# Patient Record
Sex: MALE | Race: WHITE | NOT HISPANIC OR LATINO | Employment: FULL TIME | URBAN - NONMETROPOLITAN AREA
[De-identification: names, ages, dates, MRNs, and addresses within clinical notes are randomized per-mention and may not be internally consistent; named-entity substitution may affect disease eponyms.]

---

## 2017-03-06 ENCOUNTER — OFFICE VISIT (OUTPATIENT)
Dept: INTERNAL MEDICINE | Facility: CLINIC | Age: 42
End: 2017-03-06

## 2017-03-06 VITALS
OXYGEN SATURATION: 98 % | HEIGHT: 68 IN | WEIGHT: 186.38 LBS | TEMPERATURE: 98.4 F | BODY MASS INDEX: 28.25 KG/M2 | DIASTOLIC BLOOD PRESSURE: 83 MMHG | HEART RATE: 67 BPM | SYSTOLIC BLOOD PRESSURE: 115 MMHG

## 2017-03-06 DIAGNOSIS — J01.10 SUBACUTE FRONTAL SINUSITIS: Primary | ICD-10-CM

## 2017-03-06 PROCEDURE — 99213 OFFICE O/P EST LOW 20 MIN: CPT | Performed by: INTERNAL MEDICINE

## 2017-03-06 RX ORDER — FLUTICASONE PROPIONATE 50 MCG
SPRAY, SUSPENSION (ML) NASAL
COMMUNITY
Start: 2017-02-14 | End: 2022-04-11

## 2017-03-06 RX ORDER — OMEPRAZOLE 20 MG/1
CAPSULE, DELAYED RELEASE ORAL DAILY
COMMUNITY
Start: 2016-04-07 | End: 2019-02-06 | Stop reason: SDUPTHER

## 2017-03-06 RX ORDER — CETIRIZINE HYDROCHLORIDE 10 MG/1
10 TABLET ORAL DAILY
Qty: 30 TABLET | Refills: 2 | Status: SHIPPED | OUTPATIENT
Start: 2017-03-06 | End: 2019-02-06

## 2017-03-06 NOTE — PROGRESS NOTES
Subjective  Salvatore Avtar JACK is a 42 y.o. male    HPI nasal congestion with postnasal drainage without associated fever or chills history of nausea now doing better without omeprazole. He has taken OTC decongestions without much relief is not using a steroid nose spray    The following portions of the patient's history were reviewed and updated as appropriate: allergies, current medications, past family history, past medical history, past social history, past surgical history, and problem list.     Review of Systems   HENT: Positive for congestion, sinus pressure and sneezing.    Eyes: Positive for discharge.   Respiratory: Negative for cough, chest tightness and wheezing.         Sneezing   Cardiovascular: Negative for chest pain.       Vitals:    03/06/17 1518   BP: 115/83   Pulse: 67   Temp: 98.4 °F (36.9 °C)   SpO2: 98%       Objective  Physical Exam   Constitutional: He is oriented to person, place, and time. He appears well-developed and well-nourished. No distress.   HENT:   Nose: Nose normal.   Mouth/Throat: Oropharynx is clear and moist.   Eyes: Conjunctivae and EOM are normal. No scleral icterus.   Neck: No tracheal deviation present. No thyromegaly present.   Cardiovascular: Normal rate and regular rhythm.  Exam reveals no friction rub.    No murmur heard.  Pulmonary/Chest: No respiratory distress. He has no wheezes. He has no rales.   Abdominal: Soft. He exhibits no distension and no mass. There is no tenderness. There is no guarding.   Musculoskeletal: Normal range of motion. He exhibits no deformity.   Lymphadenopathy:     He has no cervical adenopathy.   Neurological: He is alert and oriented to person, place, and time. He has normal reflexes. No cranial nerve deficit. Coordination normal.   Skin: Skin is warm and dry. No rash noted. No erythema.   Psychiatric: He has a normal mood and affect. His behavior is normal. Judgment and thought content normal.       Diagnoses and all orders for this  visit:    Subacute frontal sinusitis advised resumption of steroid nose spray. Zyrtec at nighttime. Allergen avoidance    Other orders  -     omeprazole (priLOSEC) 20 MG capsule; Take  by mouth Daily.  -     fluticasone (FLONASE) 50 MCG/ACT nasal spray;

## 2017-03-13 ENCOUNTER — HOSPITAL ENCOUNTER (EMERGENCY)
Facility: HOSPITAL | Age: 42
Discharge: HOME | End: 2017-03-13
Attending: EMERGENCY MEDICINE | Admitting: EMERGENCY MEDICINE

## 2017-03-13 ENCOUNTER — APPOINTMENT (OUTPATIENT)
Dept: CT IMAGING | Facility: HOSPITAL | Age: 42
End: 2017-03-13

## 2017-03-13 VITALS
BODY MASS INDEX: 28.04 KG/M2 | SYSTOLIC BLOOD PRESSURE: 117 MMHG | OXYGEN SATURATION: 97 % | HEART RATE: 56 BPM | TEMPERATURE: 98 F | DIASTOLIC BLOOD PRESSURE: 76 MMHG | HEIGHT: 68 IN | WEIGHT: 185 LBS | RESPIRATION RATE: 18 BRPM

## 2017-03-13 DIAGNOSIS — K42.9 UMBILICAL HERNIA WITHOUT OBSTRUCTION AND WITHOUT GANGRENE: Primary | ICD-10-CM

## 2017-03-13 LAB
ALBUMIN SERPL-MCNC: 4.6 G/DL (ref 3.5–5)
ALBUMIN/GLOB SERPL: 1.4 G/DL (ref 1–2)
ALP SERPL-CCNC: 82 U/L (ref 38–126)
ALT SERPL W P-5'-P-CCNC: 30 U/L (ref 13–69)
ANION GAP SERPL CALCULATED.3IONS-SCNC: 11.7 MMOL/L
AST SERPL-CCNC: 29 U/L (ref 15–46)
BASOPHILS # BLD AUTO: 0.05 10*3/MM3 (ref 0–0.2)
BASOPHILS NFR BLD AUTO: 0.5 % (ref 0–2.5)
BILIRUB SERPL-MCNC: 0.5 MG/DL (ref 0.2–1.3)
BILIRUB UR QL STRIP: NEGATIVE
BUN BLD-MCNC: 10 MG/DL (ref 7–20)
BUN/CREAT SERPL: 11.1 (ref 6.3–21.9)
CALCIUM SPEC-SCNC: 9.4 MG/DL (ref 8.4–10.2)
CHLORIDE SERPL-SCNC: 104 MMOL/L (ref 98–107)
CLARITY UR: CLEAR
CO2 SERPL-SCNC: 32 MMOL/L (ref 26–30)
COLOR UR: NORMAL
CREAT BLD-MCNC: 0.9 MG/DL (ref 0.6–1.3)
DEPRECATED RDW RBC AUTO: 40.9 FL (ref 37–54)
EOSINOPHIL # BLD AUTO: 0.22 10*3/MM3 (ref 0–0.7)
EOSINOPHIL NFR BLD AUTO: 2.3 % (ref 0–7)
ERYTHROCYTE [DISTWIDTH] IN BLOOD BY AUTOMATED COUNT: 12.6 % (ref 11.5–14.5)
GFR SERPL CREATININE-BSD FRML MDRD: 93 ML/MIN/1.73
GLOBULIN UR ELPH-MCNC: 3.4 GM/DL
GLUCOSE BLD-MCNC: 91 MG/DL (ref 74–98)
GLUCOSE UR STRIP-MCNC: NEGATIVE MG/DL
HCT VFR BLD AUTO: 43 % (ref 42–52)
HGB BLD-MCNC: 14.5 G/DL (ref 14–18)
HGB UR QL STRIP.AUTO: NEGATIVE
IMM GRANULOCYTES # BLD: 0.04 10*3/MM3 (ref 0–0.06)
IMM GRANULOCYTES NFR BLD: 0.4 % (ref 0–0.6)
KETONES UR QL STRIP: NEGATIVE
LEUKOCYTE ESTERASE UR QL STRIP.AUTO: NEGATIVE
LYMPHOCYTES # BLD AUTO: 1.95 10*3/MM3 (ref 0.6–3.4)
LYMPHOCYTES NFR BLD AUTO: 20 % (ref 10–50)
MCH RBC QN AUTO: 30 PG (ref 27–31)
MCHC RBC AUTO-ENTMCNC: 33.7 G/DL (ref 30–37)
MCV RBC AUTO: 88.8 FL (ref 80–94)
MONOCYTES # BLD AUTO: 0.82 10*3/MM3 (ref 0–0.9)
MONOCYTES NFR BLD AUTO: 8.4 % (ref 0–12)
NEUTROPHILS # BLD AUTO: 6.69 10*3/MM3 (ref 2–6.9)
NEUTROPHILS NFR BLD AUTO: 68.4 % (ref 37–80)
NITRITE UR QL STRIP: NEGATIVE
NRBC BLD MANUAL-RTO: 0 /100 WBC (ref 0–0)
PH UR STRIP.AUTO: 6 [PH] (ref 5–8)
PLATELET # BLD AUTO: 220 10*3/MM3 (ref 130–400)
PMV BLD AUTO: 12.1 FL (ref 6–12)
POTASSIUM BLD-SCNC: 3.7 MMOL/L (ref 3.5–5.1)
PROT SERPL-MCNC: 8 G/DL (ref 6.3–8.2)
PROT UR QL STRIP: NEGATIVE
RBC # BLD AUTO: 4.84 10*6/MM3 (ref 4.7–6.1)
SODIUM BLD-SCNC: 144 MMOL/L (ref 137–145)
SP GR UR STRIP: <=1.005 (ref 1–1.03)
UROBILINOGEN UR QL STRIP: NORMAL
WBC NRBC COR # BLD: 9.77 10*3/MM3 (ref 4.8–10.8)

## 2017-03-13 PROCEDURE — 80053 COMPREHEN METABOLIC PANEL: CPT | Performed by: PHYSICIAN ASSISTANT

## 2017-03-13 PROCEDURE — 96360 HYDRATION IV INFUSION INIT: CPT

## 2017-03-13 PROCEDURE — 81003 URINALYSIS AUTO W/O SCOPE: CPT | Performed by: PHYSICIAN ASSISTANT

## 2017-03-13 PROCEDURE — 74177 CT ABD & PELVIS W/CONTRAST: CPT

## 2017-03-13 PROCEDURE — 85025 COMPLETE CBC W/AUTO DIFF WBC: CPT | Performed by: PHYSICIAN ASSISTANT

## 2017-03-13 PROCEDURE — 99283 EMERGENCY DEPT VISIT LOW MDM: CPT

## 2017-03-13 PROCEDURE — 0 IOPAMIDOL 61 % SOLUTION: Performed by: EMERGENCY MEDICINE

## 2017-03-13 RX ORDER — SODIUM CHLORIDE 0.9 % (FLUSH) 0.9 %
10 SYRINGE (ML) INJECTION AS NEEDED
Status: DISCONTINUED | OUTPATIENT
Start: 2017-03-13 | End: 2017-03-13 | Stop reason: HOSPADM

## 2017-03-13 RX ADMIN — IOPAMIDOL 100 ML: 612 INJECTION, SOLUTION INTRAVENOUS at 20:45

## 2017-03-13 RX ADMIN — SODIUM CHLORIDE 1000 ML: 9 INJECTION, SOLUTION INTRAVENOUS at 20:08

## 2017-03-14 ENCOUNTER — OFFICE VISIT (OUTPATIENT)
Dept: SURGERY | Facility: CLINIC | Age: 42
End: 2017-03-14

## 2017-03-14 VITALS
HEART RATE: 70 BPM | HEIGHT: 68 IN | TEMPERATURE: 98.2 F | DIASTOLIC BLOOD PRESSURE: 86 MMHG | BODY MASS INDEX: 28.04 KG/M2 | WEIGHT: 185 LBS | SYSTOLIC BLOOD PRESSURE: 134 MMHG

## 2017-03-14 DIAGNOSIS — K42.0 UMBILICAL HERNIA WITH OBSTRUCTION, WITHOUT GANGRENE: Primary | ICD-10-CM

## 2017-03-14 LAB
HOLD SPECIMEN: NORMAL
HOLD SPECIMEN: NORMAL
WHOLE BLOOD HOLD SPECIMEN: NORMAL

## 2017-03-14 PROCEDURE — 99243 OFF/OP CNSLTJ NEW/EST LOW 30: CPT | Performed by: SURGERY

## 2017-03-14 NOTE — ED PROVIDER NOTES
Subjective   HPI Comments: 42-year-old male presents with pain around his mid abdomen and belly button area.  Started this morning.  He describes it as a sharp pain.  It is tender to touch.  Some nausea no vomiting.  He states that he has a history of a small hernia.  No surgeries on his abdomen.  No chest pain no shortness of breath.  No urinary symptoms.      History provided by:  Patient   used: No        Review of Systems   Gastrointestinal: Positive for abdominal pain and nausea.   All other systems reviewed and are negative.      Past Medical History   Diagnosis Date   • GERD (gastroesophageal reflux disease)    • Seasonal allergies        No Known Allergies    History reviewed. No pertinent past surgical history.    History reviewed. No pertinent family history.    Social History     Social History   • Marital status:      Spouse name: N/A   • Number of children: N/A   • Years of education: N/A     Social History Main Topics   • Smoking status: Never Smoker   • Smokeless tobacco: None   • Alcohol use None   • Drug use: No   • Sexual activity: Not Asked     Other Topics Concern   • None     Social History Narrative   • None           Objective   Physical Exam   Constitutional: He is oriented to person, place, and time. He appears well-developed and well-nourished.   HENT:   Head: Normocephalic and atraumatic.   Left Ear: External ear normal.   Eyes: EOM are normal. Pupils are equal, round, and reactive to light.   Neck: Normal range of motion.   Cardiovascular: Normal rate and regular rhythm.    Pulmonary/Chest: Effort normal and breath sounds normal.   Abdominal: Soft. Bowel sounds are normal. He exhibits no mass. There is tenderness. There is no rebound and no guarding.   Umbilical area tenderness to palpation   Musculoskeletal: Normal range of motion.   Neurological: He is alert and oriented to person, place, and time.   Skin: Skin is warm and dry.   Psychiatric: He has a normal  mood and affect. His behavior is normal. Judgment and thought content normal.   Nursing note and vitals reviewed.      Procedures         ED Course  ED Course   Comment By Time   Discussed with Dr. Ho he stated that the fat containing umbilical hernia can be seen as an outpatient he will follow-up with him in the clinic in 2 days Woo Bynum Jr., PA-C 03/13 2059                  [facility]    Final diagnoses:   Umbilical hernia without obstruction and without gangrene            Woo Bynum Jr., PA-C  03/13/17 2109

## 2017-03-14 NOTE — PROGRESS NOTES
Reason for Consultation: Umbilical Hernia      Chief complaint   Chief Complaint   Patient presents with   • Hernia     abn. ct scan       Subjective .     History of present illness:  I saw the patient in the office today as a consultation for an abnormal CT scan showing a subumbilical hernia w/ omental infarct anterior peritoneum.  Patient states he had a small bulge @ umbilical area for a few years with no pain.  Mr. Nova states he woke up on Monday with severe constant pain @ the umbilical area. Patient states he cannot get any relief.    Review of Systems   Constitutional: Negative for chills, fever and unexpected weight change.   HENT: Negative for trouble swallowing and voice change.    Eyes: Negative for visual disturbance.   Respiratory: Negative for apnea, cough, chest tightness, shortness of breath and wheezing.    Cardiovascular: Negative for chest pain, palpitations and leg swelling.   Gastrointestinal: Positive for abdominal pain (umbilical pain). Negative for abdominal distention, anal bleeding, blood in stool, constipation, diarrhea, nausea, rectal pain and vomiting.   Endocrine: Negative for cold intolerance and heat intolerance.   Genitourinary: Negative for difficulty urinating, dysuria, flank pain, scrotal swelling and testicular pain.   Musculoskeletal: Negative for back pain, gait problem and joint swelling.   Skin: Negative for color change, rash and wound.   Neurological: Negative for dizziness, syncope, speech difficulty, weakness, numbness and headaches.   Hematological: Negative for adenopathy. Does not bruise/bleed easily.   Psychiatric/Behavioral: Negative for confusion. The patient is not nervous/anxious.        History  Past Medical History   Diagnosis Date   • GERD (gastroesophageal reflux disease)    • Seasonal allergies        Past Surgical History   Procedure Laterality Date   • Tonsillectomy     • Vasectomy reversal     • Varicocele excision     • Sinus surgery          Family History   Problem Relation Age of Onset   • No Known Problems Mother    • No Known Problems Father    • No Known Problems Sister    • No Known Problems Brother        Social History   Substance Use Topics   • Smoking status: Never Smoker   • Smokeless tobacco: Never Used   • Alcohol use No       Review of patient's allergies indicates no known allergies.    Objective     Vital Signs   Temp:  [97.7 °F (36.5 °C)-98.2 °F (36.8 °C)] 98.2 °F (36.8 °C)  Heart Rate:  [56-70] 70  Resp:  [18] 18  BP: (117-134)/(76-88) 134/86    Physical Exam:     General Appearance:    Alert, cooperative, in no acute distress   Head:    Normocephalic, without obvious abnormality, atraumatic   Eyes:            Lids and lashes normal, conjunctivae and sclerae normal, no   icterus, no pallor, corneas clear, PERRLA   Ears:    Ears appear intact with no abnormalities noted   Back:     No kyphosis present, no scoliosis present, no skin lesions,      erythema or scars, no tenderness to percussion or                   palpation,   range of motion normal   Lungs:     Clear to auscultation,respirations regular, even and                  unlabored    Heart:    Regular rhythm and normal rate, normal S1 and S2, no            murmur   Abdomen:     no masses, no organomegaly, soft non-tender, non-distended, no guarding, there is evidence of a incarcerated umbilical hernia with omental contents.  Quite tender.  Not reducible.     Extremities:   Moves all extremities well, no edema, no cyanosis, no             redness   Pulses:   Pulses palpable and equal bilaterally   Skin:   No bleeding, bruising or rash   Neurologic:   Cranial nerves 2 - 12 grossly intact, sensation intact   He had trach: No evidence of depression or anxiety        Results Review:   I reviewed the patient's new clinical results. I personally reviewed the CT scan results.      Assessment/Plan : Incarcerated umbilical hernia: Symptomatic    1. Umbilical hernia with obstruction,  without gangrene        I had a detailed and extensive discussion with the patient in the office and they understand that they need to undergo umbilical hernia repair with possible mesh.  Full risks and benefits of operative versus nonoperative intervention were discussed with the patient and these included things such as nonresolution of symptoms and possible worsening of symptoms without surgical intervention versus infection, bleeding, possible recurrent hernia, possible postoperative neuralgia from nerve damage or involvement with scar tissue, etc.  The patient understands, agrees, and had no questions for me at the end of the office visit.     I discussed the patients findings and my recommendations with patient.        Humble Sanches MD  03/14/17

## 2017-03-15 ENCOUNTER — OUTSIDE FACILITY SERVICE (OUTPATIENT)
Dept: SURGERY | Facility: CLINIC | Age: 42
End: 2017-03-15

## 2017-03-15 PROCEDURE — 49587 PR REPAIR UMBILICAL HERN,5+Y/O,STRANG: CPT | Performed by: SURGERY

## 2017-03-23 ENCOUNTER — DOCUMENTATION (OUTPATIENT)
Dept: SURGERY | Facility: CLINIC | Age: 42
End: 2017-03-23

## 2017-03-23 ENCOUNTER — OFFICE VISIT (OUTPATIENT)
Dept: SURGERY | Facility: CLINIC | Age: 42
End: 2017-03-23

## 2017-03-23 VITALS
DIASTOLIC BLOOD PRESSURE: 86 MMHG | SYSTOLIC BLOOD PRESSURE: 120 MMHG | BODY MASS INDEX: 28.04 KG/M2 | TEMPERATURE: 98.8 F | WEIGHT: 185 LBS | HEIGHT: 68 IN

## 2017-03-23 DIAGNOSIS — Z48.89 POSTOPERATIVE VISIT: Primary | ICD-10-CM

## 2017-03-23 PROCEDURE — 99024 POSTOP FOLLOW-UP VISIT: CPT | Performed by: SURGERY

## 2017-03-23 RX ORDER — HYDROCODONE BITARTRATE AND ACETAMINOPHEN 5; 325 MG/1; MG/1
TABLET ORAL
COMMUNITY
Start: 2017-03-15 | End: 2017-11-13

## 2017-03-23 NOTE — PROGRESS NOTES
History of present illness:  I saw the patient in the office today as a followup from their recent hernia repair.  They state that they have done well and are having no complaints.      Vital Signs   Temp:  [98.8 °F (37.1 °C)] 98.8 °F (37.1 °C)  BP: (120)/(86) 120/86    Physical Exam:     General Appearance:    Alert, cooperative, in no acute distress   Abdomen:     no masses, no organomegaly, soft non-tender, non-distended, no guarding, wounds are well healed, no evidence of recurrent hernia       Assessment / Plan : Postoperative after an umbilical hernia repair that was incarcerated.  He is feeling much improved.  I gave him detailed activity instructions.    I did discuss the situation with the patient today in the office and they have done well from their recent herniorraphy.  I have released the patient back to normal activity, they understand that they need to be careful about heavy lifting.  I need to see the patient back in the office only if they are having further problems, they know to call me if they are.    Humble Sanches MD  03/23/17

## 2017-11-13 ENCOUNTER — OFFICE VISIT (OUTPATIENT)
Dept: INTERNAL MEDICINE | Facility: CLINIC | Age: 42
End: 2017-11-13

## 2017-11-13 VITALS
WEIGHT: 188 LBS | DIASTOLIC BLOOD PRESSURE: 76 MMHG | SYSTOLIC BLOOD PRESSURE: 128 MMHG | BODY MASS INDEX: 28.49 KG/M2 | HEIGHT: 68 IN | HEART RATE: 67 BPM | TEMPERATURE: 98.2 F | OXYGEN SATURATION: 99 %

## 2017-11-13 DIAGNOSIS — M25.562 CHRONIC PAIN OF LEFT KNEE: ICD-10-CM

## 2017-11-13 DIAGNOSIS — S29.019A THORACIC MYOFASCIAL STRAIN, INITIAL ENCOUNTER: Primary | ICD-10-CM

## 2017-11-13 DIAGNOSIS — G89.29 CHRONIC PAIN OF LEFT KNEE: ICD-10-CM

## 2017-11-13 PROBLEM — I45.6 WOLFF-PARKINSON-WHITE (WPW) PATTERN: Status: ACTIVE | Noted: 2017-11-13

## 2017-11-13 PROCEDURE — 99213 OFFICE O/P EST LOW 20 MIN: CPT | Performed by: PHYSICIAN ASSISTANT

## 2017-11-13 RX ORDER — CYCLOBENZAPRINE HCL 5 MG
5 TABLET ORAL NIGHTLY PRN
Qty: 15 TABLET | Refills: 0 | Status: SHIPPED | OUTPATIENT
Start: 2017-11-13 | End: 2018-03-13 | Stop reason: ALTCHOICE

## 2017-11-13 NOTE — PROGRESS NOTES
Salvatore Nova III is a 42 y.o. male.     Subjective   History of Present Illness   Here today with concern of back pain which began 2 days ago when he reached up above his head to grab something and before he even grabbed it he felt and heard a loud pop which caused immediate sharp pain in the mid-back.  Since then he has had baseline discomfort with sharp pain when laying on his back, bending forward and with coughing.  Denies SOA except the pain sometimes takes his breast away.      Also having chronic but worsening left knee pain anteriorly and posteriorly. Has seen Dr. Shepard before and would like to discuss with him again.         The following portions of the patient's history were reviewed and updated as appropriate: allergies, current medications, past family history, past medical history, past social history, past surgical history and problem list.    Review of Systems    Constitutional: Negative for appetite change, chills, fatigue, fever and unexpected weight change.   HENT: Negative for congestion, ear pain, hearing loss, nosebleeds, postnasal drip, rhinorrhea, sore throat, tinnitus and trouble swallowing.    Eyes: Negative for photophobia, discharge and visual disturbance.   Respiratory: Negative for cough, chest tightness, shortness of breath and wheezing.    Cardiovascular: Negative for chest pain, palpitations and leg swelling.   Gastrointestinal: Negative for abdominal distention, abdominal pain, blood in stool, constipation, diarrhea, nausea and vomiting.   Endocrine: Negative for cold intolerance, heat intolerance, polydipsia, polyphagia and polyuria.   Musculoskeletal: left knee pain, middle back pain. Negative for joint swelling, myalgias, neck pain and neck stiffness.   Skin: Negative for color change, pallor, rash and wound.   Allergic/Immunologic: Negative for environmental allergies, food allergies and immunocompromised state.   Neurological: Negative for dizziness, tremors, seizures, weakness,  "numbness and headaches.   Hematological: Negative for adenopathy. Does not bruise/bleed easily.   Psychiatric/Behavioral: Negative for sleep disturbances, agitation, behavioral problems, confusion, hallucinations, self-injury and suicidal ideas. The patient is not nervous/anxious.      Objective    Physical Exam  Constitutional: Oriented to person, place, and time. Appears well-developed and well-nourished.   HENT:   Head: Normocephalic and atraumatic.   Eyes: EOM are normal. Pupils are equal, round, and reactive to light.   Neck: Normal range of motion. Neck supple.   Cardiovascular: Normal rate, regular rhythm and normal heart sounds.    Pulmonary/Chest: Effort normal and breath sounds normal. No respiratory distress.  Has no wheezes or rales. Exhibits no chest wall tenderness.   Abdominal: Soft. Bowel sounds are normal. Exhibits no distension and no mass. There is no tenderness.   Musculoskeletal: thoracic paravertebral tenderness bilaterally. Normal range of motion.   Neurological: Alert and oriented to person, place, and time.   Skin: Skin is warm and dry.   Psychiatric: Has a normal mood and affect. Behavior is normal. Judgment and thought content normal.       /76  Pulse 67  Temp 98.2 °F (36.8 °C)  Ht 68\" (172.7 cm)  Wt 188 lb (85.3 kg)  SpO2 99%  BMI 28.59 kg/m2    Nursing note and vitals reviewed.        Assessment/Plan   Salvatore was seen today for back pain.    Diagnoses and all orders for this visit:    Thoracic myofascial strain, initial encounter  -     cyclobenzaprine (FLEXERIL) 5 MG tablet; Take 1 tablet by mouth At Night As Needed for Muscle Spasms.  -     diclofenac (VOLTAREN) 1 % gel gel; Apply 4 g topically 4 (Four) Times a Day As Needed (joint pain).    Chronic pain of left knee  -     Ambulatory Referral to Orthopedic Surgery  -     diclofenac (VOLTAREN) 1 % gel gel; Apply 4 g topically 4 (Four) Times a Day As Needed (joint pain).      Call or RTC if symptoms worsen or persist. "

## 2018-03-08 ENCOUNTER — OFFICE VISIT (OUTPATIENT)
Dept: INTERNAL MEDICINE | Facility: CLINIC | Age: 43
End: 2018-03-08

## 2018-03-08 VITALS
TEMPERATURE: 98.6 F | HEIGHT: 68 IN | DIASTOLIC BLOOD PRESSURE: 78 MMHG | BODY MASS INDEX: 28.04 KG/M2 | OXYGEN SATURATION: 99 % | WEIGHT: 185 LBS | SYSTOLIC BLOOD PRESSURE: 108 MMHG | HEART RATE: 72 BPM

## 2018-03-08 DIAGNOSIS — J01.00 ACUTE NON-RECURRENT MAXILLARY SINUSITIS: Primary | ICD-10-CM

## 2018-03-08 LAB
EXPIRATION DATE: NORMAL
EXPIRATION DATE: NORMAL
FLUAV AG NPH QL: NEGATIVE
FLUBV AG NPH QL: NEGATIVE
INTERNAL CONTROL: NORMAL
INTERNAL CONTROL: NORMAL
Lab: NORMAL
Lab: NORMAL
S PYO AG THROAT QL: NEGATIVE

## 2018-03-08 PROCEDURE — 87804 INFLUENZA ASSAY W/OPTIC: CPT | Performed by: PHYSICIAN ASSISTANT

## 2018-03-08 PROCEDURE — 99213 OFFICE O/P EST LOW 20 MIN: CPT | Performed by: PHYSICIAN ASSISTANT

## 2018-03-08 PROCEDURE — 87880 STREP A ASSAY W/OPTIC: CPT | Performed by: PHYSICIAN ASSISTANT

## 2018-03-08 RX ORDER — BROMPHENIRAMINE MALEATE, PSEUDOEPHEDRINE HYDROCHLORIDE, AND DEXTROMETHORPHAN HYDROBROMIDE 2; 30; 10 MG/5ML; MG/5ML; MG/5ML
10 SYRUP ORAL 4 TIMES DAILY PRN
Qty: 200 ML | Refills: 0 | Status: SHIPPED | OUTPATIENT
Start: 2018-03-08 | End: 2018-03-13

## 2018-03-08 RX ORDER — AMOXICILLIN AND CLAVULANATE POTASSIUM 875; 125 MG/1; MG/1
1 TABLET, FILM COATED ORAL 2 TIMES DAILY
Qty: 20 TABLET | Refills: 0 | Status: SHIPPED | OUTPATIENT
Start: 2018-03-08 | End: 2018-03-13

## 2018-03-08 NOTE — PROGRESS NOTES
Salvatore Nova III is a 43 y.o. male.     Subjective   History of Present Illness   Here today with concern of cough, headache, sore throat, body aches, nasal congestion and sneezing 1 week ago which suddenly all worsened yesterday. He denies fever, chills or SOA.         The following portions of the patient's history were reviewed and updated as appropriate: allergies, current medications, past family history, past medical history, past social history, past surgical history and problem list.    Review of Systems    Constitutional: Negative for appetite change, chills, fatigue, fever and unexpected weight change.   HENT: sore throat, congestion, sneezing.  Negative for ear pain, hearing loss, nosebleeds, postnasal drip, rhinorrhea, tinnitus and trouble swallowing.    Eyes: Negative for photophobia, discharge and visual disturbance.   Respiratory: cough. Negative for chest tightness, shortness of breath and wheezing.    Cardiovascular: Negative for chest pain, palpitations and leg swelling.   Gastrointestinal: Negative for abdominal distention, abdominal pain, blood in stool, constipation, diarrhea, nausea and vomiting.   Endocrine: Negative for cold intolerance, heat intolerance, polydipsia, polyphagia and polyuria.   Musculoskeletal: body aches. Negative for back pain, joint swelling, myalgias, neck pain and neck stiffness.   Skin: Negative for color change, pallor, rash and wound.   Allergic/Immunologic: Negative for environmental allergies, food allergies and immunocompromised state.   Neurological: headache. Negative for dizziness, tremors, seizures, weakness, numbness.   Hematological: Negative for adenopathy. Does not bruise/bleed easily.   Psychiatric/Behavioral: Negative for sleep disturbances, agitation, behavioral problems, confusion, hallucinations, self-injury and suicidal ideas. The patient is not nervous/anxious.      Objective    Physical Exam  Constitutional: Oriented to person, place, and time.  "Appears well-developed and well-nourished.   HENT: OP erythema without exudate or petechiae.   Head: maxillary sinus tenderness bilaterally. Normocephalic and atraumatic.   Eyes: EOM are normal. Pupils are equal, round, and reactive to light.   Neck: Normal range of motion. Neck supple.   Cardiovascular: Normal rate, regular rhythm and normal heart sounds.    Pulmonary/Chest: Effort normal and breath sounds normal. No respiratory distress.  Has no wheezes or rales. Exhibits no chest wall tenderness.   Abdominal: Soft. Bowel sounds are normal. Exhibits no distension and no mass. There is no tenderness.   Musculoskeletal: Normal range of motion. Exhibits no tenderness.   Neurological: Alert and oriented to person, place, and time.   Skin: Skin is warm and dry.   Psychiatric: Has a normal mood and affect. Behavior is normal. Judgment and thought content normal.       /78  Pulse 72  Temp 98.6 °F (37 °C)  Ht 172.7 cm (67.99\")  Wt 83.9 kg (185 lb)  SpO2 99%  BMI 28.14 kg/m2    Nursing note and vitals reviewed.        Assessment/Plan   Salvatore was seen today for cough.    Diagnoses and all orders for this visit:    Acute non-recurrent maxillary sinusitis  -     POC Rapid Strep A- negative  -     POC Influenza A / B- negative  -     amoxicillin-clavulanate (AUGMENTIN) 875-125 MG per tablet; Take 1 tablet by mouth 2 (Two) Times a Day for 10 days.    Other orders  -     brompheniramine-pseudoephedrine-DM 30-2-10 MG/5ML syrup; Take 10 mL by mouth 4 (Four) Times a Day As Needed for Congestion or Cough.      Call or RTC if symptoms worsen or persist.            "

## 2018-03-13 ENCOUNTER — OFFICE VISIT (OUTPATIENT)
Dept: INTERNAL MEDICINE | Facility: CLINIC | Age: 43
End: 2018-03-13

## 2018-03-13 ENCOUNTER — HOSPITAL ENCOUNTER (OUTPATIENT)
Dept: GENERAL RADIOLOGY | Facility: HOSPITAL | Age: 43
Discharge: HOME | End: 2018-03-13
Admitting: PHYSICIAN ASSISTANT

## 2018-03-13 VITALS
WEIGHT: 188 LBS | TEMPERATURE: 98.1 F | BODY MASS INDEX: 28.49 KG/M2 | HEIGHT: 68 IN | DIASTOLIC BLOOD PRESSURE: 78 MMHG | SYSTOLIC BLOOD PRESSURE: 130 MMHG | OXYGEN SATURATION: 96 % | HEART RATE: 58 BPM

## 2018-03-13 DIAGNOSIS — M54.2 NECK PAIN: Primary | ICD-10-CM

## 2018-03-13 PROCEDURE — 99213 OFFICE O/P EST LOW 20 MIN: CPT | Performed by: PHYSICIAN ASSISTANT

## 2018-03-13 PROCEDURE — 72050 X-RAY EXAM NECK SPINE 4/5VWS: CPT

## 2018-03-13 RX ORDER — METHOCARBAMOL 500 MG/1
500 TABLET, FILM COATED ORAL 3 TIMES DAILY PRN
Qty: 60 TABLET | Refills: 1 | Status: SHIPPED | OUTPATIENT
Start: 2018-03-13 | End: 2018-04-23

## 2018-03-13 NOTE — PROGRESS NOTES
"Subjective     Chief Complaint: Neck pain    History of Present Illness     Salvatore Nova III is a 43 y.o. male presenting with complaintx of neck pain and stiffness radiating into his shoulders ×2 weeks.  Patient was recently ill and treated for a sinus infection, completed a course of antibiotics, is now feeling much better.  Wasn't sure if neck pain was due to his sinus symptoms.  Patient denies recent or prior injury.  States his neck feels really tight.  Has been using a heating pad and trying to massage the area.  Taking Tylenol 2-3 times daily but has not been helping.  Has not tried diclofenac gel he had been prescribed for knee. Denies numbness, tingling, burning.  Denies weakness in arm or shoulder.    The following portions of the patient's history were reviewed and updated as appropriate: current medications, allergies, PMH.    Review of Systems   Musculoskeletal: Positive for neck pain and neck stiffness.       Objective     Vitals:    03/13/18 1307   BP: 130/78   Pulse: 58   Temp: 98.1 °F (36.7 °C)   SpO2: 96%   Weight: 85.3 kg (188 lb)   Height: 172.7 cm (67.99\")     Physical Exam   Constitutional: He appears well-developed and well-nourished.   Musculoskeletal:        Right shoulder: Normal.        Left shoulder: Normal.        Cervical back: He exhibits pain and spasm. He exhibits normal range of motion and no deformity.   Pain with flexion and extension.  Some pain with rotation.     Assessment/Plan     Diagnoses and all orders for this visit:    Neck pain  -     XR Spine Cervical Complete 4 or 5 View  -     methocarbamol (ROBAXIN) 500 MG tablet; Take 1 tablet by mouth 3 (Three) Times a Day As Needed for Muscle Spasms.      Discussed applying heat 20 minutes a day 3 times daily.    Gentle stretching.  Will try muscle relaxer prn.    Agnieszka Mg PA-C  03/13/2018         Please note that portions of this note were completed with a voice recognition program. Efforts were made to edit dictation, but " occasionally words are mistranscribed.

## 2018-03-22 ENCOUNTER — OFFICE VISIT (OUTPATIENT)
Dept: INTERNAL MEDICINE | Facility: CLINIC | Age: 43
End: 2018-03-22

## 2018-03-22 ENCOUNTER — TELEPHONE (OUTPATIENT)
Dept: INTERNAL MEDICINE | Facility: CLINIC | Age: 43
End: 2018-03-22

## 2018-03-22 VITALS
SYSTOLIC BLOOD PRESSURE: 128 MMHG | HEIGHT: 68 IN | TEMPERATURE: 97.4 F | WEIGHT: 194 LBS | BODY MASS INDEX: 29.4 KG/M2 | OXYGEN SATURATION: 95 % | HEART RATE: 51 BPM | DIASTOLIC BLOOD PRESSURE: 74 MMHG

## 2018-03-22 DIAGNOSIS — M62.838 MUSCLE SPASM: Primary | ICD-10-CM

## 2018-03-22 PROCEDURE — 99214 OFFICE O/P EST MOD 30 MIN: CPT | Performed by: INTERNAL MEDICINE

## 2018-03-22 RX ORDER — MELOXICAM 7.5 MG/1
7.5 TABLET ORAL DAILY
Qty: 60 TABLET | Refills: 0 | Status: SHIPPED | OUTPATIENT
Start: 2018-03-22 | End: 2019-02-06

## 2018-03-22 RX ORDER — METHYLPREDNISOLONE 4 MG/1
TABLET ORAL
Qty: 1 EACH | Refills: 0 | Status: SHIPPED | OUTPATIENT
Start: 2018-03-22 | End: 2018-04-23

## 2018-03-22 NOTE — TELEPHONE ENCOUNTER
KATERIN CALLED AND NEEDS CLARIFICATION ON MELOXICAM, HAS 2 SET OF INSTRUCTIONS. PER DR VERMEESCH'S NOTE, ADVISES TO TAKE 1-2 TIMES A DAY WITH FOOD. PHARMACIST (KRISTI) NOTIFIED.

## 2018-03-22 NOTE — PROGRESS NOTES
"Chief Complaint   Patient presents with   • Neck Pain     and shoulder pain, right side.      Subjective   Salvatore Nova III is a 43 y.o. male.     Neck Pain    This is a recurrent problem. The current episode started 1 to 4 weeks ago. The problem occurs constantly. The problem has been waxing and waning. The pain is associated with nothing. The pain is present in the left side. The quality of the pain is described as aching. The pain is at a severity of 5/10. The pain is moderate. Nothing aggravates the symptoms. Stiffness is present all day and in the morning. Pertinent negatives include no headaches, numbness, paresis, tingling or weakness. He has tried muscle relaxants, heat, home exercises and acetaminophen for the symptoms. The treatment provided mild relief.        The following portions of the patient's history were reviewed and updated as appropriate: allergies, current medications, past family history, past medical history, past social history, past surgical history and problem list.    Review of Systems   Musculoskeletal: Positive for neck pain and neck stiffness.   Neurological: Negative for tingling, weakness, numbness and headaches.       Objective   /74   Pulse 51   Temp 97.4 °F (36.3 °C)   Ht 172.7 cm (68\")   Wt 88 kg (194 lb)   SpO2 95%   BMI 29.50 kg/m²   Body mass index is 29.5 kg/m².  Physical Exam   Constitutional: He is oriented to person, place, and time. He appears well-developed and well-nourished.   HENT:   Head: Normocephalic and atraumatic.   Eyes: EOM are normal. Pupils are equal, round, and reactive to light.   Neck:   Limited range of motion in all planes, pain over left trapezius muscle   Pulmonary/Chest: Effort normal.   Musculoskeletal:   Left shoulder with full range of motion, pain over left deltoid muscle, left arm muscle strength 5/5   Neurological: He is alert and oriented to person, place, and time. He has normal reflexes. No cranial nerve deficit. Coordination " normal.   Psychiatric: He has a normal mood and affect. His behavior is normal. Judgment and thought content normal.   Nursing note and vitals reviewed.      Assessment/Plan   Salvatore Nova III is here today and the following problems have been addressed:      Salvatore was seen today for neck pain.    Diagnoses and all orders for this visit:    Muscle spasm    Other orders  -     meloxicam (MOBIC) 7.5 MG tablet; Take 1 tablet by mouth Daily. 1-2 tabs daily  -     MethylPREDNISolone (MEDROL, ANGELLA,) 4 MG tablet; Take as directed on package instructions.    given mobic to take 1-2 times a day with food  Given medrol dose angella to take as directed  Take omeprazole as directed also  Given neck exercises to do 2-3 times a day  Recommend heat 3 times a day for 15 minutes  Reviewed recent x-ray of cervical spine with patient    RTC if sxs worsen or persist  Please note that portions of this note were completed with a voice recognition program.  Efforts were made to edit dictation, but occasionally words are mistranscribed.

## 2018-04-18 ENCOUNTER — TELEPHONE (OUTPATIENT)
Dept: INTERNAL MEDICINE | Facility: CLINIC | Age: 43
End: 2018-04-18

## 2018-04-18 NOTE — TELEPHONE ENCOUNTER
Patient wants to get in to see Dr. Vermeesh as soon as possible. He was seen on 3/20/2018 and given a steroid for his neck and back. There was improvement, but now he is back to having pain. Dr. NORMAN does not have a follow up opening until 4/27/2018. I offered an mid-level provider, states that he was told that he had to see Dr. NORMAN from her on out for the current issue. Please call patient to discuss.

## 2018-04-18 NOTE — TELEPHONE ENCOUNTER
Notified pt that she dose not have any openings soon and that he should try to call on Monday to see if he can be worked in. Patient understood and is ok with waiting until she gets back.

## 2018-04-23 ENCOUNTER — OFFICE VISIT (OUTPATIENT)
Dept: INTERNAL MEDICINE | Facility: CLINIC | Age: 43
End: 2018-04-23

## 2018-04-23 VITALS
BODY MASS INDEX: 27.89 KG/M2 | HEART RATE: 56 BPM | DIASTOLIC BLOOD PRESSURE: 70 MMHG | TEMPERATURE: 97.7 F | OXYGEN SATURATION: 98 % | HEIGHT: 68 IN | WEIGHT: 184 LBS | SYSTOLIC BLOOD PRESSURE: 126 MMHG

## 2018-04-23 DIAGNOSIS — M62.838 MUSCLE SPASM: Primary | ICD-10-CM

## 2018-04-23 DIAGNOSIS — M54.2 NECK PAIN: ICD-10-CM

## 2018-04-23 LAB
ALBUMIN SERPL-MCNC: 4.3 G/DL (ref 3.5–5)
ALBUMIN/GLOB SERPL: 1.5 G/DL (ref 1–2)
ALP SERPL-CCNC: 77 U/L (ref 38–126)
ALT SERPL-CCNC: 29 U/L (ref 13–69)
AST SERPL-CCNC: 21 U/L (ref 15–46)
BILIRUB SERPL-MCNC: 0.7 MG/DL (ref 0.2–1.3)
BUN SERPL-MCNC: 12 MG/DL (ref 7–20)
BUN/CREAT SERPL: 15 (ref 6.3–21.9)
CALCIUM SERPL-MCNC: 9.7 MG/DL (ref 8.4–10.2)
CHLORIDE SERPL-SCNC: 104 MMOL/L (ref 98–107)
CK SERPL-CCNC: 72 U/L (ref 30–170)
CO2 SERPL-SCNC: 29 MMOL/L (ref 26–30)
CREAT SERPL-MCNC: 0.8 MG/DL (ref 0.6–1.3)
GFR SERPLBLD CREATININE-BSD FMLA CKD-EPI: 107 ML/MIN/1.73
GFR SERPLBLD CREATININE-BSD FMLA CKD-EPI: 129 ML/MIN/1.73
GLOBULIN SER CALC-MCNC: 2.9 GM/DL
GLUCOSE SERPL-MCNC: 90 MG/DL (ref 74–98)
MAGNESIUM SERPL-MCNC: 2.1 MG/DL (ref 1.6–2.3)
MYOGLOBIN SERPL-MCNC: 21.5 NG/ML (ref 0–101)
POTASSIUM SERPL-SCNC: 4.5 MMOL/L (ref 3.5–5.1)
PROT SERPL-MCNC: 7.2 G/DL (ref 6.3–8.2)
SODIUM SERPL-SCNC: 145 MMOL/L (ref 137–145)

## 2018-04-23 PROCEDURE — 99213 OFFICE O/P EST LOW 20 MIN: CPT | Performed by: INTERNAL MEDICINE

## 2018-04-23 NOTE — PROGRESS NOTES
"Chief Complaint   Patient presents with   • Abstract     Pt states he's still having muscle spasms in back and also has pain in left shoulder.     Subjective   Salvatore Nova III is a 43 y.o. male.     Patient here for ongoing muscle spasms in back and neck area.  Last visit he was given Mobic and Medrol Dosepak.  If he stretches a certain way he can make his left mid back spasm.  He does feel the muscle quiver at times.   He does not lift heavy things very often.   No recent back injury.  The left mid back bothers him daily.   His neck is no longer bothering him since last month.   He is not taking the mobic very often.  He feels the medrol angella was quite helpful.   No FH of MS, ALS, autoimmune diseases.           The following portions of the patient's history were reviewed and updated as appropriate: allergies, current medications, past family history, past medical history, past social history, past surgical history and problem list.    Review of Systems   Constitutional: Negative for unexpected weight change.   Musculoskeletal: Positive for myalgias. Negative for arthralgias, neck pain and neck stiffness.       Objective   /70   Pulse 56   Temp 97.7 °F (36.5 °C)   Ht 172.7 cm (68\")   Wt 83.5 kg (184 lb)   SpO2 98%   BMI 27.98 kg/m²   Body mass index is 27.98 kg/m².  Physical Exam   Constitutional: He is oriented to person, place, and time. He appears well-developed and well-nourished.   HENT:   Head: Normocephalic and atraumatic.   Eyes: EOM are normal. Pupils are equal, round, and reactive to light.   Pulmonary/Chest: Effort normal.   Musculoskeletal: Normal range of motion. He exhibits tenderness.   Left upper extremity/shoulder with full range of motion, pain to palpation around left rhomboid and subscapularis muscles   Neurological: He is alert and oriented to person, place, and time.   Psychiatric: He has a normal mood and affect. His behavior is normal. Judgment and thought content normal. "   Nursing note and vitals reviewed.      Assessment/Plan   Salvatore Nova III is here today and the following problems have been addressed:      Salvatore was seen today for abstract.    Diagnoses and all orders for this visit:    Muscle spasm  -     Comprehensive Metabolic Panel  -     Magnesium  -     Myoglobin, Serum  -     CK  -     Ambulatory Referral to Physical Therapy Evaluate and treat    Neck pain    labs as noted  Recommend turmeric as directed  Refer to PT for left mid back spasms  Continue heat and stretches prn    RTC 4 weeks, but if doing better may cancel appt and return prn    Please note that portions of this note were completed with a voice recognition program.  Efforts were made to edit dictation, but occasionally words are mistranscribed.

## 2018-04-24 NOTE — PROGRESS NOTES
Please tell patient his muscle enzymes, magnesium, electrolytes, kidney and liver function are all normal.  Continue current plan of care discussed at clinic visit.

## 2018-04-25 ENCOUNTER — TELEPHONE (OUTPATIENT)
Dept: INTERNAL MEDICINE | Facility: CLINIC | Age: 43
End: 2018-04-25

## 2018-04-25 NOTE — TELEPHONE ENCOUNTER
----- Message from Marilyn K Vermeesch, MD sent at 4/24/2018  7:30 AM EDT -----  Please tell patient his muscle enzymes, magnesium, electrolytes, kidney and liver function are all normal.  Continue current plan of care discussed at clinic visit.

## 2018-06-06 ENCOUNTER — TELEPHONE (OUTPATIENT)
Dept: INTERNAL MEDICINE | Facility: CLINIC | Age: 43
End: 2018-06-06

## 2018-06-06 DIAGNOSIS — R06.83 SNORING: Primary | ICD-10-CM

## 2018-09-18 ENCOUNTER — OFFICE VISIT (OUTPATIENT)
Dept: PULMONOLOGY | Facility: CLINIC | Age: 43
End: 2018-09-18

## 2018-09-18 VITALS
HEIGHT: 68 IN | WEIGHT: 184 LBS | SYSTOLIC BLOOD PRESSURE: 130 MMHG | RESPIRATION RATE: 16 BRPM | HEART RATE: 59 BPM | BODY MASS INDEX: 27.89 KG/M2 | DIASTOLIC BLOOD PRESSURE: 84 MMHG | OXYGEN SATURATION: 97 %

## 2018-09-18 DIAGNOSIS — G47.52 DREAM ENACTMENT BEHAVIOR: ICD-10-CM

## 2018-09-18 DIAGNOSIS — R06.83 SNORING: ICD-10-CM

## 2018-09-18 DIAGNOSIS — G47.19 EXCESSIVE DAYTIME SLEEPINESS: ICD-10-CM

## 2018-09-18 DIAGNOSIS — G47.33 OBSTRUCTIVE SLEEP APNEA: Primary | ICD-10-CM

## 2018-09-18 PROCEDURE — 99204 OFFICE O/P NEW MOD 45 MIN: CPT | Performed by: INTERNAL MEDICINE

## 2018-09-18 NOTE — PROGRESS NOTES
"CONSULT NOTE    Requested by:    Vermeesch, Marilyn K, MD      Chief Complaint   Patient presents with   • Consult   • Sleeping Problem       Subjective   Salvatore Nova III is a 43 y.o. male.     History of Present Illness   Patient was sent in today for evaluation of sleep disturbance. Patient says that for the past few years, he has had trouble with snoring.     Patient says that he feels somewhat tired in the morning after waking up. he is also complaining of occasionally falling asleep while watching TV and sometimes while reading a book.    he is not complaining of occasional headaches.  He also mentions occasional nights where he started dreams.    Patient's sleep schedule was reviewed. he drinks 1 cups/cans of caffeinated drinks per day.     he does not have a family history of DAYNA.     Patient's Epsworth Sleepiness score was 10/24.      The following portions of the patient's history were reviewed and updated as appropriate: allergies, current medications, past family history, past medical history, past social history and past surgical history.    Review of Systems   HENT: Positive for sinus pressure. Negative for sneezing and sore throat.    Respiratory: Negative for cough, chest tightness, shortness of breath and wheezing.    Cardiovascular: Negative for palpitations and leg swelling.   Psychiatric/Behavioral: Positive for sleep disturbance.   All other systems reviewed and are negative.      Past Medical History:   Diagnosis Date   • GERD (gastroesophageal reflux disease)    • Seasonal allergies        Social History   Substance Use Topics   • Smoking status: Never Smoker   • Smokeless tobacco: Never Used   • Alcohol use No         Objective   Visit Vitals  /84   Pulse 59   Resp 16   Ht 172.7 cm (67.99\")   Wt 83.5 kg (184 lb)   SpO2 97%   BMI 27.98 kg/m²       Physical Exam   Constitutional: He is oriented to person, place, and time. He appears well-developed and well-nourished.   HENT:   Head: " Atraumatic.   Crowded Oropharynx.    Eyes: Pupils are equal, round, and reactive to light. EOM are normal.   Neck: No JVD present. No tracheal deviation present. No thyromegaly present.   Cardiovascular: Normal rate and regular rhythm.    Pulmonary/Chest: Effort normal and breath sounds normal. No respiratory distress. He has no wheezes.   Musculoskeletal: Normal range of motion. He exhibits no edema.   Neurological: He is alert and oriented to person, place, and time.   Skin: Skin is warm and dry.   Psychiatric: He has a normal mood and affect. His behavior is normal.   Vitals reviewed.        Assessment/Plan   Salvatore was seen today for consult and sleeping problem.    Diagnoses and all orders for this visit:    Obstructive sleep apnea  -     Home Sleep Study; Future    Snoring  -     Home Sleep Study; Future    Excessive daytime sleepiness  -     Home Sleep Study; Future    Dream enactment behavior           Return in about 3 months (around 12/18/2018) for Recheck, Sleep study, Overbook.    DISCUSSION(if any):  Laboratory workup was also reviewed which showed CO2 level of 29.     ===========================  ===========================    Sleep questionnaire was reviewed with the patient    The pathophysiology of sleep apnea was discussed, with the patient.     We will encourage him to schedule the sleep study soon.     The patient was made aware of the limitation of the home sleep study, whereby it may underestimate the true AHI and also carries a low sensitivity.  I have informed him that even if the home sleep study is negative, we may suggest an in lab sleep study to completely and definitively rule out/in sleep apnea.  The patient has understood.  This was communicated to the patient, in case home study is to be requested.    The patient is agreeable to try CPAP/BiPAP, if needed.     Patient was educated on good sleep hygiene measures and voiced understanding of the same.     Patient was given reading material  regarding sleep apnea    Dictated utilizing Dragon dictation.    This document was electronically signed by Paula Lowe MD September 18, 2018  10:46 AM

## 2018-10-22 ENCOUNTER — HOSPITAL ENCOUNTER (OUTPATIENT)
Dept: SLEEP MEDICINE | Facility: HOSPITAL | Age: 43
Setting detail: THERAPIES SERIES
End: 2018-10-22
Attending: INTERNAL MEDICINE

## 2018-10-22 DIAGNOSIS — G47.19 EXCESSIVE DAYTIME SLEEPINESS: ICD-10-CM

## 2018-10-22 DIAGNOSIS — G47.33 OBSTRUCTIVE SLEEP APNEA: ICD-10-CM

## 2018-10-22 DIAGNOSIS — R06.83 SNORING: ICD-10-CM

## 2018-10-22 PROCEDURE — 95806 SLEEP STUDY UNATT&RESP EFFT: CPT

## 2018-10-22 PROCEDURE — 95806 SLEEP STUDY UNATT&RESP EFFT: CPT | Performed by: INTERNAL MEDICINE

## 2018-10-29 DIAGNOSIS — G47.33 OSA (OBSTRUCTIVE SLEEP APNEA): Primary | ICD-10-CM

## 2018-12-24 ENCOUNTER — TELEPHONE (OUTPATIENT)
Dept: OTHER | Facility: OTHER | Age: 43
End: 2018-12-24

## 2018-12-24 RX ORDER — OSELTAMIVIR PHOSPHATE 75 MG/1
75 CAPSULE ORAL 2 TIMES DAILY
Qty: 10 CAPSULE | Refills: 0 | OUTPATIENT
Start: 2018-12-24 | End: 2019-01-03 | Stop reason: SDUPTHER

## 2018-12-24 NOTE — TELEPHONE ENCOUNTER
It is up to patient whether or not he wants to take Tamiflu, but you may call in Tamiflu 75 mg twice a day for 5 days, #10 tablets.

## 2018-12-24 NOTE — TELEPHONE ENCOUNTER
Patient said his child was just diagnosed with the flu, he got his flu shot but wasn't sure if he should go ahead and get prescribed tamiflu or what you would recommend. Please advise.  Phone # [phone]

## 2019-01-03 RX ORDER — OSELTAMIVIR PHOSPHATE 75 MG/1
75 CAPSULE ORAL 2 TIMES DAILY
Qty: 10 CAPSULE | Refills: 0 | OUTPATIENT
Start: 2019-01-03 | End: 2019-02-06

## 2019-02-04 ENCOUNTER — TELEPHONE (OUTPATIENT)
Dept: INTERNAL MEDICINE | Facility: CLINIC | Age: 44
End: 2019-02-04

## 2019-02-04 DIAGNOSIS — J32.9 CHRONIC CONGESTION OF PARANASAL SINUS: Primary | ICD-10-CM

## 2019-02-04 NOTE — TELEPHONE ENCOUNTER
"Pt just got CPAP machine and he is having trouble with sinuses, he had a surgery with ENT in 2011 and they thought he might need this again. Pt is wondering if we can refer him to ENT.  He didn't remember the name of the doctor who did the surgery, said it was the \"doctor off of [facility]\"    Phone # [phone]  "

## 2019-02-06 ENCOUNTER — OFFICE VISIT (OUTPATIENT)
Dept: INTERNAL MEDICINE | Facility: CLINIC | Age: 44
End: 2019-02-06

## 2019-02-06 VITALS
TEMPERATURE: 97.8 F | BODY MASS INDEX: 28.64 KG/M2 | SYSTOLIC BLOOD PRESSURE: 128 MMHG | WEIGHT: 189 LBS | HEIGHT: 68 IN | HEART RATE: 62 BPM | OXYGEN SATURATION: 98 % | DIASTOLIC BLOOD PRESSURE: 80 MMHG

## 2019-02-06 DIAGNOSIS — F52.4 ACQUIRED GENERALIZED PREMATURE EJACULATION: ICD-10-CM

## 2019-02-06 DIAGNOSIS — J01.00 SUBACUTE MAXILLARY SINUSITIS: ICD-10-CM

## 2019-02-06 DIAGNOSIS — K21.9 GASTROESOPHAGEAL REFLUX DISEASE WITHOUT ESOPHAGITIS: Primary | ICD-10-CM

## 2019-02-06 DIAGNOSIS — G47.33 OSA (OBSTRUCTIVE SLEEP APNEA): ICD-10-CM

## 2019-02-06 PROBLEM — G89.29 CHRONIC PAIN OF LEFT KNEE: Status: RESOLVED | Noted: 2017-11-13 | Resolved: 2019-02-06

## 2019-02-06 PROBLEM — M25.562 CHRONIC PAIN OF LEFT KNEE: Status: RESOLVED | Noted: 2017-11-13 | Resolved: 2019-02-06

## 2019-02-06 PROBLEM — M62.838 MUSCLE SPASM: Status: RESOLVED | Noted: 2018-03-22 | Resolved: 2019-02-06

## 2019-02-06 PROCEDURE — 99214 OFFICE O/P EST MOD 30 MIN: CPT | Performed by: INTERNAL MEDICINE

## 2019-02-06 RX ORDER — AMOXICILLIN AND CLAVULANATE POTASSIUM 875; 125 MG/1; MG/1
1 TABLET, FILM COATED ORAL EVERY 12 HOURS SCHEDULED
Qty: 20 TABLET | Refills: 0 | Status: SHIPPED | OUTPATIENT
Start: 2019-02-06 | End: 2019-03-25

## 2019-02-06 RX ORDER — OMEPRAZOLE 20 MG/1
20 CAPSULE, DELAYED RELEASE ORAL DAILY
Qty: 30 CAPSULE | Refills: 6 | Status: SHIPPED | OUTPATIENT
Start: 2019-02-06 | End: 2019-10-03 | Stop reason: SDUPTHER

## 2019-02-06 NOTE — PROGRESS NOTES
Chief Complaint   Patient presents with   • Abstract     Pt states he's been having sinus drainage, congestion, and he's been vomiting (possibly from the drainage) Also states he's been getting cotton mouth from CPAP.      Subjective   Salvatore Nova III is a 44 y.o. male.     Pt here with complaints of sinus congestion and dry mouth with use of CPAP machine.   He started using the CPAP in December and noted dryness of sinuses.  He used it for only 5 days and stopped due to dryness and severe congestion.   He tried again at end of January and wore CPAP for only 4 days, but he got dry mouth and severe congestion with pain in sinuses.    He has vomiting 3-4 times a week in the AM usually due to coughing.   He has had this vomiting in the morning for several yrs.    He is not taking his omeprazole.     He is also complaining of problems with premature ejaculation.  He denies any problem with decreased libido or erectile dysfunction.  This has been going on for approximately one year.  He states he and his wife are happy in their marriage.  This is becoming a problem though as his wife is unhappy with this situation.      Sinus Problem   This is a new problem. The current episode started 1 to 4 weeks ago. The problem has been waxing and waning since onset. Associated symptoms include congestion, coughing, headaches and sinus pressure. Pertinent negatives include no ear pain, shortness of breath or sore throat. (Vomiting  Dry mouth  Maxillary sinus pain, worse with mouth movement)        The following portions of the patient's history were reviewed and updated as appropriate: allergies, current medications, past family history, past medical history, past social history, past surgical history and problem list.    Review of Systems   Constitutional: Negative for activity change, appetite change, fatigue and fever.   HENT: Positive for congestion and sinus pressure. Negative for ear pain and sore throat.    Respiratory:  "Positive for cough. Negative for shortness of breath and wheezing.    Gastrointestinal: Positive for nausea and vomiting.        Significant GERD symptoms with acid taste in mouth frequently   Neurological: Positive for headaches.   All other systems reviewed and are negative.      Objective   /80   Pulse 62   Temp 97.8 °F (36.6 °C)   Ht 172.7 cm (67.99\")   Wt 85.7 kg (189 lb)   SpO2 98%   BMI 28.75 kg/m²   Body mass index is 28.75 kg/m².  Physical Exam   Constitutional: He is oriented to person, place, and time. He appears well-developed and well-nourished. No distress.   Pleasant man, he sounds congested today   HENT:   Head: Normocephalic and atraumatic.   Right Ear: External ear normal.   Left Ear: External ear normal.   Mouth/Throat: Oropharynx is clear and moist.   Tympanic membranes dull, turbinates erythematous right greater than left, right frontal and maxillary sinus tenderness, white postnasal drip noted   Eyes: Conjunctivae and EOM are normal. Pupils are equal, round, and reactive to light.   Neck: Normal range of motion. Neck supple. No thyromegaly present.   Cardiovascular: Normal rate, regular rhythm and normal heart sounds.   No murmur heard.  Pulmonary/Chest: Effort normal and breath sounds normal. No respiratory distress. He has no wheezes.   Musculoskeletal: Normal range of motion.   Lymphadenopathy:     He has no cervical adenopathy.   Neurological: He is alert and oriented to person, place, and time. No cranial nerve deficit. Coordination normal.   Psychiatric: He has a normal mood and affect. His behavior is normal. Judgment and thought content normal.   Nursing note and vitals reviewed.      Assessment/Plan   Salvatore Nova III is here today and the following problems have been addressed:      Salvatore was seen today for abstract.    Diagnoses and all orders for this visit:    Gastroesophageal reflux disease without esophagitis    DAYNA (obstructive sleep apnea)    Subacute maxillary " sinusitis    Acquired generalized premature ejaculation  -     Ambulatory Referral to Urology    Other orders  -     omeprazole (priLOSEC) 20 MG capsule; Take 1 capsule by mouth Daily.  -     amoxicillin-clavulanate (AUGMENTIN) 875-125 MG per tablet; Take 1 tablet by mouth Every 12 (Twelve) Hours.    given augmentin for 10 days, take twice daily with food  mucinex D recommended for sxs  Given omeprazole to take every AM for GERD sxs  He has a referral for ENT due to history of sinus issues and worsening sxs  Recommend he see Dr Lowe for follow up of DAYNA after he sees ENT  Will refer to urology     RTC prn    Please note that portions of this note were completed with a voice recognition program.  Efforts were made to edit dictation, but occasionally words are mistranscribed.

## 2019-02-28 ENCOUNTER — TELEPHONE (OUTPATIENT)
Dept: INTERNAL MEDICINE | Facility: CLINIC | Age: 44
End: 2019-02-28

## 2019-02-28 DIAGNOSIS — K21.9 GASTROESOPHAGEAL REFLUX DISEASE WITHOUT ESOPHAGITIS: Primary | ICD-10-CM

## 2019-03-05 DIAGNOSIS — L84 CALLUS OF HEEL: Primary | ICD-10-CM

## 2019-03-06 ENCOUNTER — OUTSIDE FACILITY SERVICE (OUTPATIENT)
Dept: GASTROENTEROLOGY | Facility: CLINIC | Age: 44
End: 2019-03-06

## 2019-03-06 ENCOUNTER — LAB REQUISITION (OUTPATIENT)
Dept: LAB | Facility: HOSPITAL | Age: 44
End: 2019-03-06

## 2019-03-06 DIAGNOSIS — K21.9 GASTRO-ESOPHAGEAL REFLUX DISEASE WITHOUT ESOPHAGITIS: ICD-10-CM

## 2019-03-06 PROCEDURE — 88305 TISSUE EXAM BY PATHOLOGIST: CPT | Performed by: INTERNAL MEDICINE

## 2019-03-06 PROCEDURE — 43239 EGD BIOPSY SINGLE/MULTIPLE: CPT | Performed by: INTERNAL MEDICINE

## 2019-03-07 ENCOUNTER — TELEPHONE (OUTPATIENT)
Dept: GASTROENTEROLOGY | Facility: CLINIC | Age: 44
End: 2019-03-07

## 2019-03-07 LAB
CYTO UR: NORMAL
LAB AP CASE REPORT: NORMAL
LAB AP CLINICAL INFORMATION: NORMAL
PATH REPORT.FINAL DX SPEC: NORMAL
PATH REPORT.GROSS SPEC: NORMAL

## 2019-03-07 NOTE — PROGRESS NOTES
Please call office of Dr. Robbie Her ENT.  Gastroenterologist suspects possible eosinophilic esophagitis and recommends allergy consult.  Please ask Dr. Her's nurse if he performs allergy testing, or does he recommend that I make referral to an allergist.

## 2019-03-07 NOTE — TELEPHONE ENCOUNTER
Maren Wheeler Called from [facility]. She stated that patient had an EGD yesterday. Patient complains of bloating. No abdominal pain, no fever, no rectal bleeding. Is there anything for patient to worry about at this time? I told Maren that I hear of bloating the day after EGD all the time but I will inform you. Please advise. Thanks

## 2019-03-08 NOTE — PROGRESS NOTES
Please tell patient that I received results from his EGD.  The pathology does not suggest eosinophilic esophagitis and he likely will not require allergy testing.  He will need to continue his current Protonix or omeprazole given per GI doctor for his underlying GERD symptoms.  It is important that he take this medication on an empty stomach with just water and no food or other medications to control his GERD symptoms.

## 2019-03-08 NOTE — TELEPHONE ENCOUNTER
Dr Gimenez,  I talked to patient this morning. He stated that he doesn't have the bloating anymore and he is feeling fine today.

## 2019-03-11 ENCOUNTER — TELEPHONE (OUTPATIENT)
Dept: GASTROENTEROLOGY | Facility: CLINIC | Age: 44
End: 2019-03-11

## 2019-03-11 NOTE — TELEPHONE ENCOUNTER
I called and left a message for Mr. Nova.  There were changes of reflux on the esophageal biopsies.  My recommendation is to take Protonix 40 mg daily or omeprazole 40 mg daily.

## 2019-03-25 ENCOUNTER — OFFICE VISIT (OUTPATIENT)
Dept: UROLOGY | Facility: CLINIC | Age: 44
End: 2019-03-25

## 2019-03-25 VITALS
SYSTOLIC BLOOD PRESSURE: 120 MMHG | WEIGHT: 189 LBS | HEIGHT: 68 IN | DIASTOLIC BLOOD PRESSURE: 80 MMHG | TEMPERATURE: 97.6 F | OXYGEN SATURATION: 96 % | BODY MASS INDEX: 28.64 KG/M2 | HEART RATE: 51 BPM

## 2019-03-25 DIAGNOSIS — Z91.09 ENVIRONMENTAL ALLERGIES: Primary | ICD-10-CM

## 2019-03-25 DIAGNOSIS — K21.9 GASTROESOPHAGEAL REFLUX DISEASE WITHOUT ESOPHAGITIS: ICD-10-CM

## 2019-03-25 DIAGNOSIS — F52.4 PREMATURE EJACULATION: Primary | ICD-10-CM

## 2019-03-25 DIAGNOSIS — I45.6 WOLFF-PARKINSON-WHITE (WPW) PATTERN: ICD-10-CM

## 2019-03-25 PROCEDURE — 99244 OFF/OP CNSLTJ NEW/EST MOD 40: CPT | Performed by: UROLOGY

## 2019-03-25 RX ORDER — PAROXETINE HYDROCHLORIDE 20 MG/1
20 TABLET, FILM COATED ORAL NIGHTLY
Qty: 30 TABLET | Refills: 11 | Status: SHIPPED | OUTPATIENT
Start: 2019-03-25 | End: 2020-07-02

## 2019-03-25 NOTE — PROGRESS NOTES
Chief Complaint  Premature ejaculation    Referring Provider:  Vermeesch, Marilyn K, MD    HPI  Mr. Nova is a 44 y.o. male with history of vasectomy and reversal in 2007 who presents with PE.    He says his intravaginal ejaculatory latency time is usually around 1-2 minutes.    + Hx of WPW; no past EP or ablation procedures; saw a cardiologist many years ago  Has an EKG yearly    He says erections are 10/10.   Partner is his wife,  11 years  He says PE has been going on for 1 year  He denies any changes in his marriage or social changes; no new partners  Denies anxiety or depression.  He is only other medical problem is GERD.     Past Medical History  Past Medical History:   Diagnosis Date   • GERD (gastroesophageal reflux disease)    • Seasonal allergies        Past Surgical History  Past Surgical History:   Procedure Laterality Date   • HERNIA REPAIR     • SINUS SURGERY     • TONSILLECTOMY     • VARICOCELE EXCISION     • VASECTOMY REVERSAL         Medications    Current Outpatient Medications:   •  amoxicillin-clavulanate (AUGMENTIN) 875-125 MG per tablet, Take 1 tablet by mouth Every 12 (Twelve) Hours., Disp: 20 tablet, Rfl: 0  •  diclofenac (VOLTAREN) 1 % gel gel, Apply 4 g topically 4 (Four) Times a Day As Needed (joint pain)., Disp: 100 g, Rfl: 5  •  fluticasone (FLONASE) 50 MCG/ACT nasal spray, , Disp: , Rfl:   •  omeprazole (priLOSEC) 20 MG capsule, Take 1 capsule by mouth Daily., Disp: 30 capsule, Rfl: 6    Allergies  No Known Allergies    Social History  Social History     Socioeconomic History   • Marital status:      Spouse name: Not on file   • Number of children: Not on file   • Years of education: Not on file   • Highest education level: Not on file   Tobacco Use   • Smoking status: Never Smoker   • Smokeless tobacco: Never Used   Substance and Sexual Activity   • Alcohol use: No   • Drug use: No   • Sexual activity: Defer       Family History  He has + family history of prostate  "cancer in grandfather      Review of Systems  Constitutional: No fevers or chills  Skin: Negative for rash  Endocrine: No heat/cold intolerance   Cardiovascular: Negative for chest pain or dyspnea on exertion  Respiratory: Negative for shortness of breath or wheezing  Gastrointestinal: No constipation, nausea or vomiting  Genitourinary: Negative for new lower urinary tract symptoms, current gross hematuria or dysuria.  Musculoskeletal: No flank pain  Neurological:  Negative for frequent headaches or dizziness  Lymph/Heme: Negative for leg swelling or calf pain.      Physical Exam  Visit Vitals  Ht 172.7 cm (67.99\")   Wt 85.7 kg (189 lb)   BMI 28.74 kg/m²     Constitutional: NAD, WDWN.   HEENT: NCAT. Conjunctivae normal.  MMM.    Cardiovascular: Regular rate.  Pulmonary/Chest: Respirations are even and non-labored bilaterally.  Abdominal: Soft. No distension, tenderness, masses or guarding. No CVA tenderness.  Neurological: A + O x 3.  Cranial Nerves II-XII grossly intact. Normal gait.  Extremities: KEREN x 4, Warm. No clubbing.  No cyanosis.    Skin: Pink, warm and dry.  No rashes noted.  Psychiatric:  Normal mood and affect    Genitourinary  Penis: uncircumcised penis, glans normal, no penile discharge.  No rashes/lesions.    Testes: descended bilaterally, no masses, nontender to palpation. Remainder of scrotal contents normal. No hernia appreciated.      Labs  No results found for: PSA    Lab Results   Component Value Date    GLUCOSE 91 03/13/2017    CALCIUM 9.7 04/23/2018     04/23/2018    K 4.5 04/23/2018    CO2 29.0 04/23/2018     04/23/2018    BUN 12 04/23/2018    CREATININE 0.80 04/23/2018    EGFRIFAFRI 129 04/23/2018    EGFRIFNONA 107 04/23/2018    BCR 15.0 04/23/2018    ANIONGAP 11.7 03/13/2017       Lab Results   Component Value Date    WBC 9.77 03/13/2017    HGB 14.5 03/13/2017    HCT 43.0 03/13/2017    MCV 88.8 03/13/2017     03/13/2017       Radiologic Studies         Assessment  Mr. " Avtar is a 44 y.o. male who presents with premature ejaculation.  He has not had any social or environmental changes.  He denies anxiety or depression.  He denies any ongoing marital discord.    We discussed premature ejaculation and several the treatment options.  Treatment options discussed were sex therapy, topical anesthetic cream, and off label treatment with SSRI.  I informed him of the risks, benefits, and alternatives to each treatment.  After hearing the risks, he elected to proceed with daily Paroxetine.     Plan  1.  Paroxetine 20 mg QHS  2.  FU in 3 mo      Titus Mulligan MD

## 2019-03-28 ENCOUNTER — PATIENT MESSAGE (OUTPATIENT)
Dept: INTERNAL MEDICINE | Facility: CLINIC | Age: 44
End: 2019-03-28

## 2019-04-02 ENCOUNTER — TELEPHONE (OUTPATIENT)
Dept: INTERNAL MEDICINE | Facility: CLINIC | Age: 44
End: 2019-04-02

## 2019-04-02 NOTE — TELEPHONE ENCOUNTER
Please tell patient that there is nothing in my last note stating anything about need for orthotics or any abnormalities of his feet.  Please tell him that my office note will need to reflect these abnormalities and he will need to make an appointment so we can discuss this before we can send in this prescription.

## 2019-04-02 NOTE — TELEPHONE ENCOUNTER
Regarding: FW: Prescription Question  Contact: [phone]  Please see this patient's request.  Could you please call [facility] and discuss his orthotics.  Thank you    ----- Message -----  From: Linda Yo MA  Sent: 3/28/2019   3:16 PM  To: Marilyn K Vermeesch, MD  Subject: FW: Prescription Question                            ----- Message -----  From: Salvatore Nova III  Sent: 3/28/2019   3:02 PM  To: Chun Amado  Clinical Pool  Subject: Prescription Question                            ----- Message from Mychart, Generic sent at 3/28/2019  3:02 PM EDT -----    I talked to Rachid about getting custom inserts made. They said it has to go through a Durable medical place. I found one in [place] called [facility]. This place stated that you will have to write a prescription using the code , stating it is needed for abnormal arches, something to that extent! They said if you called them they would give you the exact wording to put in the notes and prescription to get it approved.  [phone] is their number. Thanks

## 2019-04-02 NOTE — TELEPHONE ENCOUNTER
I spoke with Martha at [facility], she said they just need the script with everything on it and they need an office note stating why Pt needs inserts. I've filled the script out for you.

## 2019-04-08 LAB
A ALTERNATA IGE QN: <0.1 KU/L
A FUMIGATUS IGE QN: 0.33 KU/L
BERMUDA GRASS IGE QN: <0.1 KU/L
BOXELDER IGE QN: <0.1 KU/L
C HERBARUM IGE QN: 0.16 KU/L
CAT DANDER IGE QN: <0.1 KU/L
CMN PIGWEED IGE QN: 0.13 KU/L
COMMON RAGWEED IGE QN: <0.1 KU/L
CONV CLASS DESCRIPTION: ABNORMAL
COTTONWOOD IGE QN: <0.1 KU/L
D FARINAE IGE QN: 0.77 KU/L
D PTERONYSS IGE QN: 0.12 KU/L
DOG DANDER IGE QN: <0.1 KU/L
IGE SERPL-ACNC: 20 IU/ML (ref 6–495)
LONDON PLANE IGE QN: <0.1 KU/L
MOUSE URINE PROT IGE QN: <0.1 KU/L
MT JUNIPER IGE QN: 3.25 KU/L
P NOTATUM IGE QN: 0.2 KU/L
PECAN/HICK TREE IGE QN: <0.1 KU/L
ROACH IGE QN: <0.1 KU/L
SALTWORT IGE QN: <0.1 KU/L
SHEEP SORREL IGE QN: <0.1 KU/L
SILVER BIRCH IGE QN: <0.1 KU/L
TIMOTHY IGE QN: <0.1 KU/L
WALNUT IGE QN: <0.1 KU/L
WHITE ASH IGE QN: <0.1 KU/L
WHITE ELM IGE QN: <0.1 KU/L
WHITE MULBERRY IGE QN: <0.1 KU/L
WHITE OAK IGE QN: <0.1 KU/L

## 2019-04-23 DIAGNOSIS — L57.0 ACTINIC KERATOSIS: Primary | ICD-10-CM

## 2019-05-03 ENCOUNTER — TELEPHONE (OUTPATIENT)
Dept: INTERNAL MEDICINE | Facility: CLINIC | Age: 44
End: 2019-05-03

## 2019-05-03 NOTE — TELEPHONE ENCOUNTER
"Patient is interested in a \"carpal tunnel test\", states that it is affecting both hands. Please advise.   "

## 2019-05-08 ENCOUNTER — OFFICE VISIT (OUTPATIENT)
Dept: INTERNAL MEDICINE | Facility: CLINIC | Age: 44
End: 2019-05-08

## 2019-05-08 VITALS
HEIGHT: 68 IN | TEMPERATURE: 98.4 F | WEIGHT: 190 LBS | OXYGEN SATURATION: 97 % | BODY MASS INDEX: 28.79 KG/M2 | HEART RATE: 75 BPM | DIASTOLIC BLOOD PRESSURE: 80 MMHG | SYSTOLIC BLOOD PRESSURE: 124 MMHG

## 2019-05-08 DIAGNOSIS — M19.049 ARTHRITIS OF HAND: ICD-10-CM

## 2019-05-08 DIAGNOSIS — S93.402A SPRAIN OF LEFT ANKLE, UNSPECIFIED LIGAMENT, INITIAL ENCOUNTER: Primary | ICD-10-CM

## 2019-05-08 PROBLEM — J01.00 SUBACUTE MAXILLARY SINUSITIS: Status: RESOLVED | Noted: 2019-02-06 | Resolved: 2019-05-08

## 2019-05-08 PROCEDURE — 99213 OFFICE O/P EST LOW 20 MIN: CPT | Performed by: INTERNAL MEDICINE

## 2019-05-08 RX ORDER — CETIRIZINE HYDROCHLORIDE 10 MG/1
TABLET ORAL
COMMUNITY
Start: 2019-04-10 | End: 2019-10-08 | Stop reason: SDUPTHER

## 2019-05-08 NOTE — PROGRESS NOTES
"Chief Complaint   Patient presents with   • Abstract     Pt states his hands and wrist tend to ache alot, seems to be getting worse. Pt states he hurt his left ankle about 2 weeks ago and it's still sore. Pt needs to discuss shoe inserts.      Subjective   Salvatore Nova III is a 44 y.o. male.     Pt here today with complaints of bilateral hand pain.  He has stiffness in hands worse at end of day.  He is on computer all day.  He feels there may be warmth in knuckles and some swelling.  No FH of RA that he knows of.  He has not been taking anything for this.     He is also complaining of left ankle pain for 2 weeks.  He stepped off porch and turned ankle.  He felt a pop.  He has less pain and swelling.  He has pain on unlevel surfaces.  It is at least 75% better overall.  He used the diclofenac gel.      He saw Dr Jack for his foot problems.  He wrote for shoe inserts but his insurance would not cover inserts written for.  Order for shoe inserts has to go to a company that provides custom inserts.  He needs a specific order written to go with Dr. Jack's notes.         The following portions of the patient's history were reviewed and updated as appropriate: allergies, current medications, past family history, past medical history, past social history, past surgical history and problem list.    Review of Systems   Constitutional: Negative for activity change.   Musculoskeletal: Positive for arthralgias and joint swelling.   Neurological: Negative for weakness.       Objective   /80   Pulse 75   Temp 98.4 °F (36.9 °C)   Ht 172.7 cm (67.99\")   Wt 86.2 kg (190 lb)   SpO2 97%   BMI 28.90 kg/m²   Body mass index is 28.9 kg/m².  Physical Exam   Constitutional: He is oriented to person, place, and time. He appears well-developed and well-nourished. No distress.   HENT:   Head: Normocephalic and atraumatic.   Eyes: EOM are normal. Pupils are equal, round, and reactive to light.   Pulmonary/Chest: Effort normal. "   Musculoskeletal:   Hands: No significant inflammation or warmth noted over MCP joints, no synovial thickening,  strength is 5/5 in hands bilaterally  Left lower extremity: Lateral malleolus with mild inflammation noted, there is pain with dorsiflexion and internal rotation of ankle, mild pain to palpation inferior and medial to malleolus   Neurological: He is alert and oriented to person, place, and time. No cranial nerve deficit. Coordination normal.   Psychiatric: He has a normal mood and affect. His behavior is normal. Judgment and thought content normal.   Nursing note and vitals reviewed.      Assessment/Plan   Salvatore Nova III is here today and the following problems have been addressed:      Salvatore was seen today for abstract.    Diagnoses and all orders for this visit:    Sprain of left ankle, unspecified ligament, initial encounter    Arthritis of hand    Encourage patient to wear high top shoe for left ankle sprain, sprain is markedly improved  No need for x-ray of ankle at this time unless ankle does not improve over next 6 to 8 weeks  Suspect early arthritis of hands due to overuse syndrome  Recommend turmeric to help alleviate arthritis symptoms, take with food  Prescription written for custom inserts in shoes due to underlying plantar fasciitis and tibial tendinitis of right foot per Dr. Jack's note    Return to clinic as needed    Please note that portions of this note were completed with a voice recognition program.  Efforts were made to edit dictation, but occasionally words are mistranscribed.

## 2019-07-01 ENCOUNTER — OFFICE VISIT (OUTPATIENT)
Dept: UROLOGY | Facility: CLINIC | Age: 44
End: 2019-07-01

## 2019-07-01 VITALS — TEMPERATURE: 97.9 F | RESPIRATION RATE: 16 BRPM | WEIGHT: 190 LBS | BODY MASS INDEX: 28.79 KG/M2 | HEIGHT: 68 IN

## 2019-07-01 DIAGNOSIS — F52.4 ACQUIRED GENERALIZED PREMATURE EJACULATION: Primary | ICD-10-CM

## 2019-07-01 PROCEDURE — 99213 OFFICE O/P EST LOW 20 MIN: CPT | Performed by: UROLOGY

## 2019-07-01 NOTE — PROGRESS NOTES
Chief Complaint  Premature ejaculation    HPI  Mr. Nova is a 44 y.o. male with history of vasectomy and reversal in 2007 who presents for follow-up of PE.  At last visit he was started on paroxetine.    He says originally his intravaginal ejaculatory latency time was usually around 1-2 minutes, since starting SSRI it has over doubled. It has been staisfactory for him and now enabled his wife to climax.  He does note SE of morning grogginess / sedation.    To review,  + Hx of WPW; no past EP or ablation procedures; saw a cardiologist many years ago  Has an EKG yearly  He says erections are 10/10.   Partner is his wife,  11 years  He says PE has been going on for 1 year  He denies any changes in his marriage or social changes; no new partners  Denies anxiety or depression.  He is only other medical problem is GERD.     Past Medical History  Past Medical History:   Diagnosis Date   • GERD (gastroesophageal reflux disease)    • Seasonal allergies        Past Surgical History  Past Surgical History:   Procedure Laterality Date   • HERNIA REPAIR     • SINUS SURGERY     • TONSILLECTOMY     • VARICOCELE EXCISION     • VASECTOMY REVERSAL         Medications    Current Outpatient Medications:   •  cetirizine (zyrTEC) 10 MG tablet, , Disp: , Rfl:   •  diclofenac (VOLTAREN) 1 % gel gel, Apply 4 g topically 4 (Four) Times a Day As Needed (joint pain)., Disp: 100 g, Rfl: 5  •  fluticasone (FLONASE) 50 MCG/ACT nasal spray, , Disp: , Rfl:   •  omeprazole (priLOSEC) 20 MG capsule, Take 1 capsule by mouth Daily., Disp: 30 capsule, Rfl: 6  •  PARoxetine (PAXIL) 20 MG tablet, Take 1 tablet by mouth Every Night., Disp: 30 tablet, Rfl: 11    Allergies  No Known Allergies    Social History  Social History     Socioeconomic History   • Marital status:      Spouse name: Not on file   • Number of children: Not on file   • Years of education: Not on file   • Highest education level: Not on file   Tobacco Use   • Smoking  "status: Never Smoker   • Smokeless tobacco: Never Used   Substance and Sexual Activity   • Alcohol use: No   • Drug use: No   • Sexual activity: Defer       Family History  He has + family history of prostate cancer in grandfather      Review of Systems  Constitutional: No fevers or chills  Skin: Negative for rash  Endocrine: No heat/cold intolerance   Cardiovascular: Negative for chest pain or dyspnea on exertion  Respiratory: Negative for shortness of breath or wheezing  Gastrointestinal: No constipation, nausea or vomiting  Genitourinary: Negative for new lower urinary tract symptoms, current gross hematuria or dysuria.  Musculoskeletal: No flank pain  Neurological:  Negative for frequent headaches or dizziness  Lymph/Heme: Negative for leg swelling or calf pain.      Physical Exam  Visit Vitals  Temp 97.9 °F (36.6 °C)   Resp 16   Ht 172.7 cm (67.99\")   Wt 86.2 kg (190 lb)   BMI 28.90 kg/m²     Constitutional: NAD, WDWN.   HEENT: NCAT. Conjunctivae normal.  MMM.    Cardiovascular: Regular rate.  Pulmonary/Chest: Respirations are even and non-labored bilaterally.  Abdominal: Soft. No distension, tenderness, masses or guarding. No CVA tenderness.  Neurological: A + O x 3.  Cranial Nerves II-XII grossly intact. Normal gait.  Extremities: KEREN x 4, Warm. No clubbing.  No cyanosis.    Skin: Pink, warm and dry.  No rashes noted.  Psychiatric:  Normal mood and affect    Labs  No results found for: PSA    Lab Results   Component Value Date    GLUCOSE 91 03/13/2017    CALCIUM 9.7 04/23/2018     04/23/2018    K 4.5 04/23/2018    CO2 29.0 04/23/2018     04/23/2018    BUN 12 04/23/2018    CREATININE 0.80 04/23/2018    EGFRIFAFRI 129 04/23/2018    EGFRIFNONA 107 04/23/2018    BCR 15.0 04/23/2018    ANIONGAP 11.7 03/13/2017       Lab Results   Component Value Date    WBC 9.77 03/13/2017    HGB 14.5 03/13/2017    HCT 43.0 03/13/2017    MCV 88.8 03/13/2017     03/13/2017       Radiologic Studies   "       Assessment  Mr. Nova is a 44 y.o. male who presents with premature ejaculation.  He has not had any social or environmental changes.  He denies anxiety or depression.  He denies any ongoing marital discord.  He has experienced great improvement with SSRI, though he does have known side effect of sedation.  This is acceptable to him.      Plan  1.  Continue Paroxetine 20 mg QHS -I told him to experiment with taking at different times of day  2.  FU in 1 yr, after that we can have Dr. Vermeesch refill it      Titus Mulligan MD

## 2019-10-03 DIAGNOSIS — G89.29 CHRONIC PAIN OF LEFT KNEE: ICD-10-CM

## 2019-10-03 DIAGNOSIS — M25.562 CHRONIC PAIN OF LEFT KNEE: ICD-10-CM

## 2019-10-03 DIAGNOSIS — S29.019A THORACIC MYOFASCIAL STRAIN, INITIAL ENCOUNTER: ICD-10-CM

## 2019-10-03 RX ORDER — OMEPRAZOLE 20 MG/1
20 CAPSULE, DELAYED RELEASE ORAL DAILY
Qty: 30 CAPSULE | Refills: 6 | Status: SHIPPED | OUTPATIENT
Start: 2019-10-03 | End: 2019-11-04

## 2019-10-03 RX ORDER — FAMOTIDINE 40 MG/1
40 TABLET, FILM COATED ORAL DAILY
Qty: 90 TABLET | Refills: 3 | Status: SHIPPED | OUTPATIENT
Start: 2019-10-03 | End: 2019-11-04 | Stop reason: ALTCHOICE

## 2019-10-08 RX ORDER — CETIRIZINE HYDROCHLORIDE 10 MG/1
10 TABLET ORAL DAILY
Qty: 90 TABLET | Refills: 3 | Status: SHIPPED | OUTPATIENT
Start: 2019-10-08 | End: 2021-01-14 | Stop reason: SDUPTHER

## 2019-11-04 ENCOUNTER — OFFICE VISIT (OUTPATIENT)
Dept: INTERNAL MEDICINE | Facility: CLINIC | Age: 44
End: 2019-11-04

## 2019-11-04 VITALS
SYSTOLIC BLOOD PRESSURE: 126 MMHG | HEIGHT: 68 IN | HEART RATE: 90 BPM | BODY MASS INDEX: 29.7 KG/M2 | WEIGHT: 196 LBS | OXYGEN SATURATION: 99 % | TEMPERATURE: 99.2 F | DIASTOLIC BLOOD PRESSURE: 74 MMHG

## 2019-11-04 DIAGNOSIS — K21.9 GASTROESOPHAGEAL REFLUX DISEASE WITHOUT ESOPHAGITIS: ICD-10-CM

## 2019-11-04 DIAGNOSIS — J02.0 STREP PHARYNGITIS: Primary | ICD-10-CM

## 2019-11-04 LAB
EXPIRATION DATE: ABNORMAL
INTERNAL CONTROL: ABNORMAL
Lab: ABNORMAL
S PYO RRNA THROAT QL PROBE: POSITIVE

## 2019-11-04 PROCEDURE — 99213 OFFICE O/P EST LOW 20 MIN: CPT | Performed by: PHYSICIAN ASSISTANT

## 2019-11-04 PROCEDURE — 87651 STREP A DNA AMP PROBE: CPT | Performed by: PHYSICIAN ASSISTANT

## 2019-11-04 RX ORDER — AMOXICILLIN 875 MG/1
875 TABLET, COATED ORAL 2 TIMES DAILY
Qty: 20 TABLET | Refills: 0 | Status: SHIPPED | OUTPATIENT
Start: 2019-11-04 | End: 2020-01-15

## 2019-11-04 RX ORDER — RANITIDINE 300 MG/1
300 TABLET ORAL NIGHTLY
Qty: 30 TABLET | Refills: 11 | Status: SHIPPED | OUTPATIENT
Start: 2019-11-04 | End: 2020-06-16 | Stop reason: ALTCHOICE

## 2019-11-04 RX ORDER — RANITIDINE 150 MG/1
150 CAPSULE ORAL EVERY EVENING
Qty: 30 CAPSULE | Refills: 11 | Status: SHIPPED | OUTPATIENT
Start: 2019-11-04 | End: 2019-11-04 | Stop reason: SDUPTHER

## 2019-11-04 NOTE — PROGRESS NOTES
Salvatore Nova III is a 44 y.o. male.     Subjective   History of Present Illness   Here today with concern of 2 days of sore throat, cough, body aches, chills, ear fullness, postnasal drip, nausea and vomiting. No fever, headache, SOA, wheezing or chest tightness. He has not taken anything for the symptoms.          The following portions of the patient's history were reviewed and updated as appropriate: allergies, current medications, past family history, past medical history, past social history, past surgical history and problem list.    Review of Systems   Constitutional: Positive for chills. Negative for activity change, appetite change, diaphoresis, fatigue, fever and unexpected weight change.   HENT: Positive for congestion, ear pain, postnasal drip, rhinorrhea, sore throat and trouble swallowing. Negative for drooling, ear discharge, hearing loss, mouth sores, sinus pressure, tinnitus and voice change.    Eyes: Negative for visual disturbance.   Respiratory: Positive for cough. Negative for apnea, choking, chest tightness, shortness of breath, wheezing and stridor.    Cardiovascular: Negative for chest pain, palpitations and leg swelling.   Gastrointestinal: Positive for nausea and vomiting. Negative for abdominal pain, blood in stool and diarrhea.   Musculoskeletal: Positive for arthralgias and neck pain. Negative for gait problem, myalgias and neck stiffness.   Allergic/Immunologic: Negative for environmental allergies and immunocompromised state.   Neurological: Negative for dizziness, tremors, speech difficulty, weakness, light-headedness, numbness and headaches.   Hematological: Does not bruise/bleed easily.   Psychiatric/Behavioral: Negative for agitation, confusion, decreased concentration, self-injury, sleep disturbance and suicidal ideas. The patient is not hyperactive.          Objective    Physical Exam   Constitutional: He is oriented to person, place, and time. He appears well-developed and  "well-nourished. No distress.   HENT:   Head: Normocephalic and atraumatic.   Nose: Nose normal.   Mouth/Throat: No oropharyngeal exudate.   Mild right TM effusion and moderate left TM effusion.  Mild OP erythema. No sinus tenderness.    Eyes: Conjunctivae and EOM are normal. Pupils are equal, round, and reactive to light.   Neck: Normal range of motion. Neck supple.   Cardiovascular: Normal rate, regular rhythm and normal heart sounds. Exam reveals no gallop and no friction rub.   No murmur heard.  Pulmonary/Chest: Effort normal and breath sounds normal. No stridor. No respiratory distress. He has no wheezes. He has no rales. He exhibits no tenderness.   Abdominal: Soft. Bowel sounds are normal. He exhibits no distension and no mass. There is tenderness (epigastric). There is no guarding. No hernia.   Musculoskeletal: Normal range of motion. He exhibits no edema, tenderness or deformity.   Lymphadenopathy:     He has cervical adenopathy (anterior bilaterally).   Neurological: He is alert and oriented to person, place, and time. He displays normal reflexes. No cranial nerve deficit or sensory deficit. He exhibits normal muscle tone. Coordination normal.   Skin: Skin is warm and dry. Capillary refill takes less than 2 seconds. No rash noted. He is not diaphoretic. No pallor.   Psychiatric: He has a normal mood and affect. His behavior is normal. Judgment and thought content normal.   Nursing note and vitals reviewed.        /74   Pulse 90   Temp 99.2 °F (37.3 °C)   Ht 172.7 cm (67.99\")   Wt 88.9 kg (196 lb)   SpO2 99%   BMI 29.81 kg/m²     Nursing note and vitals reviewed.          Assessment/Plan   Salvatore was seen today for uri.    Diagnoses and all orders for this visit:    Strep pharyngitis  -     POCT Strep A, molecular - positive  -     amoxicillin (AMOXIL) 875 MG tablet; Take 1 tablet by mouth 2 (Two) Times a Day.    Gastroesophageal reflux disease without esophagitis  -     raNITIdine (ZANTAC) 300 MG " tablet; Take 1 tablet by mouth Every Night.      Increase PO fluid intake. Ibuprofen and/or Tylenol prn for pain and fever control.   Begin using new toothbrush after 2 days of antibiotic use.     Call or RTC if symptoms worsen or persist.

## 2019-11-18 RX ORDER — BROMPHENIRAMINE MALEATE, PSEUDOEPHEDRINE HYDROCHLORIDE, AND DEXTROMETHORPHAN HYDROBROMIDE 2; 30; 10 MG/5ML; MG/5ML; MG/5ML
10 SYRUP ORAL 4 TIMES DAILY PRN
Qty: 200 ML | Refills: 0 | Status: SHIPPED | OUTPATIENT
Start: 2019-11-18 | End: 2021-02-08 | Stop reason: HOSPADM

## 2020-01-15 ENCOUNTER — OFFICE VISIT (OUTPATIENT)
Dept: PULMONOLOGY | Facility: CLINIC | Age: 45
End: 2020-01-15

## 2020-01-15 VITALS
HEIGHT: 68 IN | WEIGHT: 195 LBS | OXYGEN SATURATION: 98 % | BODY MASS INDEX: 29.55 KG/M2 | SYSTOLIC BLOOD PRESSURE: 120 MMHG | HEART RATE: 81 BPM | RESPIRATION RATE: 18 BRPM | DIASTOLIC BLOOD PRESSURE: 78 MMHG

## 2020-01-15 DIAGNOSIS — G47.19 EXCESSIVE DAYTIME SLEEPINESS: ICD-10-CM

## 2020-01-15 DIAGNOSIS — G47.33 OSA (OBSTRUCTIVE SLEEP APNEA): Primary | ICD-10-CM

## 2020-01-15 PROCEDURE — 99213 OFFICE O/P EST LOW 20 MIN: CPT | Performed by: NURSE PRACTITIONER

## 2020-01-15 NOTE — PROGRESS NOTES
Chief Complaint   Patient presents with   • Follow-up   • Sleeping Problem         Subjective   Salvatore Nova III is a 45 y.o. male.     History of Present Illness   The patient comes in today for follow-up of obstructive sleep apnea.    I reviewed his sleep study and discussed the results with him today.  The sleep study revealed moderate sleep apnea with an apnea hypopnea index of 27 per hour.  his apnea-hypopnea index was worse in supine position.    He has been set up with AutoPAP at a pressure of 6/14.  Once he began using the machine he began having a very dry mouth.  He tried increasing the humidity and it did not help a great deal.  He also began having more sinus issues and felt that the CPAP machine was making him sick.  He followed up with ENT for the sinus issues.  They started him on Zyrtec and Zantac.    He had a sinus surgery several years ago which seemed to help tremendously and he thought maybe his sinuses were full again.  This does not seem to be the issue as per ENT.    He has not been using the machine and has actually been sleeping in a recliner the first half of the night.  Even without using the machine his mouth is extremely dry.    He also reports having near constant nasal congestion.  He uses Flonase regularly.  He states he has had some bleeding from the nose recently which he feels is due to the Flonase.     The following portions of the patient's history were reviewed and updated as appropriate: allergies, current medications, past family history, past medical history, past social history and past surgical history.    Review of Systems   Constitutional: Negative for chills and fever.   HENT: Positive for rhinorrhea, sinus pressure and sore throat.    Respiratory: Positive for cough, shortness of breath and wheezing. Negative for chest tightness.    Psychiatric/Behavioral: Positive for sleep disturbance.       Objective   Visit Vitals  /78   Pulse 81   Resp 18   Ht 172.7 cm  "(67.99\")   Wt 88.5 kg (195 lb)   SpO2 98%   BMI 29.66 kg/m²     Physical Exam   Constitutional: He is oriented to person, place, and time. He appears well-developed and well-nourished.   HENT:   Head: Normocephalic and atraumatic.   Crowded oropharynx.    Musculoskeletal:   Gait was normal.   Neurological: He is alert and oriented to person, place, and time.   Psychiatric: He has a normal mood and affect.   Vitals reviewed.          Assessment/Plan   Salvatore was seen today for follow-up and sleeping problem.    Diagnoses and all orders for this visit:    DAYNA (obstructive sleep apnea)  -     BIPAP / CPAP Adjustment    Excessive daytime sleepiness           Return in about 3 months (around 4/15/2020) for Recheck, For Me, Sleep ONLY.    DISCUSSION (if any):  Continue treatment with AutoPAP at a pressure of 6/14, with a full-face mask.  He definitely seems that he is a mouth breather especially given the constant nasal congestion he feels he cannot breathe out of his nose therefore he will need to continue to use a full facemask.    Since he has having such issues with dry mouth I have asked him to increase the humidity setting to the maximum which is 5.  He thinks he is currently on 3.  I have also ordered heated tubing to see if this helps increase the humidity.     I have explained to the patient that these 2 settings, heat and humidity, can be adjusted independently of one another.    He is not compliant with AutoPap at this time but hopefully with the adjustments he will be able to tolerate the machine.    Humidification setup, hose and mask care discussed.    Weight loss advised.    Use every night for at least 4 hours stressed.      Dictated utilizing Dragon dictation.    This document was electronically signed by HARSHAD Brock January 15, 2020  1:37 PM   "

## 2020-06-16 RX ORDER — FAMOTIDINE 40 MG/1
40 TABLET, FILM COATED ORAL DAILY
Qty: 90 TABLET | Refills: 3 | Status: SHIPPED | OUTPATIENT
Start: 2020-06-16

## 2020-07-01 ENCOUNTER — OFFICE VISIT (OUTPATIENT)
Dept: PULMONOLOGY | Facility: CLINIC | Age: 45
End: 2020-07-01

## 2020-07-01 VITALS
HEART RATE: 75 BPM | SYSTOLIC BLOOD PRESSURE: 122 MMHG | BODY MASS INDEX: 30.45 KG/M2 | DIASTOLIC BLOOD PRESSURE: 74 MMHG | RESPIRATION RATE: 16 BRPM | WEIGHT: 194 LBS | HEIGHT: 67 IN | OXYGEN SATURATION: 97 %

## 2020-07-01 DIAGNOSIS — G47.19 EXCESSIVE DAYTIME SLEEPINESS: ICD-10-CM

## 2020-07-01 DIAGNOSIS — G47.33 OSA (OBSTRUCTIVE SLEEP APNEA): Primary | ICD-10-CM

## 2020-07-01 PROCEDURE — 99213 OFFICE O/P EST LOW 20 MIN: CPT | Performed by: NURSE PRACTITIONER

## 2020-07-01 NOTE — PROGRESS NOTES
"Chief Complaint   Patient presents with   • Follow-up   • Sleeping Problem         Subjective   Salvatore Nova III is a 45 y.o. male.     History of Present Illness   Patient comes back today for follow up of Obstructive Sleep apnea. he doesn't report any issues with the device or mask.     The patient has not been using the machine. He states he was very dry. He did not get the heated hose for the machine and our office called to see why patient had not received hose and it was brought to our office.     He was provided with heated hose today and humidity and temperature was set on his machine. He is using a full facemask.     The following portions of the patient's history were reviewed and updated as appropriate: allergies, current medications, past family history, past medical history, past social history and past surgical history.    Review of Systems   Constitutional: Positive for fatigue.   Respiratory: Positive for apnea.    Psychiatric/Behavioral: Positive for sleep disturbance.       Objective   Visit Vitals  /74   Pulse 75   Resp 16   Ht 170.2 cm (67\")   Wt 88 kg (194 lb)   SpO2 97%   BMI 30.38 kg/m²     Physical Exam   Constitutional: He is oriented to person, place, and time. He appears well-developed and well-nourished.   HENT:   Head: Normocephalic and atraumatic.   Crowded oropharynx.    Musculoskeletal:   Gait was normal.   Neurological: He is alert and oriented to person, place, and time.   Psychiatric: He has a normal mood and affect.   Vitals reviewed.        Assessment/Plan   Salvatore was seen today for follow-up and sleeping problem.    Diagnoses and all orders for this visit:    DAYNA (obstructive sleep apnea)    Excessive daytime sleepiness           Return in about 5 months (around 12/1/2020) for Recheck, For Dr. Lowe, Sleep ONLY.    DISCUSSION (if any):  He has not been compliant with the machine, however now that the humidity has been set and he has received heated tubing and the heat has " been set for him hopefully he can use it without having extreme oral dryness.     I have asked him to call the office if he continues to have issues tolerating the machine.     Dictated utilizing Dragon dictation.    This document was electronically signed by HARSHAD Brock July 1, 2020  15:33

## 2020-07-02 ENCOUNTER — OFFICE VISIT (OUTPATIENT)
Dept: UROLOGY | Facility: CLINIC | Age: 45
End: 2020-07-02

## 2020-07-02 VITALS — WEIGHT: 194 LBS | BODY MASS INDEX: 30.45 KG/M2 | TEMPERATURE: 98 F | HEIGHT: 67 IN

## 2020-07-02 DIAGNOSIS — N50.89 SCROTAL SWELLING: ICD-10-CM

## 2020-07-02 DIAGNOSIS — F52.4 PREMATURE EJACULATION: Primary | ICD-10-CM

## 2020-07-02 PROCEDURE — 99214 OFFICE O/P EST MOD 30 MIN: CPT | Performed by: UROLOGY

## 2020-07-02 RX ORDER — PAROXETINE 10 MG/1
10 TABLET, FILM COATED ORAL NIGHTLY
Qty: 30 TABLET | Refills: 2 | Status: SHIPPED | OUTPATIENT
Start: 2020-07-02 | End: 2020-11-24

## 2020-07-02 RX ORDER — RANITIDINE 300 MG/1
TABLET ORAL
COMMUNITY
Start: 2020-04-07 | End: 2020-10-01 | Stop reason: RX

## 2020-07-02 NOTE — PROGRESS NOTES
Chief Complaint  Premature ejaculation    HPI  Mr. Nova is a 45 y.o. male with history of vasectomy and reversal in 2007 who presents for follow-up of PE.  At last visit he was started on paroxetine.    He says originally his intravaginal ejaculatory latency time was usually around 1-2 minutes, since starting SSRI it has over doubled. It was initially satisfactory, but now he is lasting too long he says.  He has subsequently started taking the medicine every other day or every few days.  He says that his wife can tell when he does not take the medicine because he is more irritable.  He says that she likes the way he acts while he is on an SSRI better.  He also complains of new bilateral scrotal swelling.  He denies any fevers or new  lower urinary tract symptoms.    To review,  + Hx of WPW; no past EP or ablation procedures; saw a cardiologist many years ago  Has an EKG yearly  He says erections are 10/10.   Partner is his wife,  11 years  He says PE has been going on for 1 year  He denies any changes in his marriage or social changes; no new partners  Denies anxiety or depression.  He is only other medical problem is GERD.     Past Medical History  Past Medical History:   Diagnosis Date   • GERD (gastroesophageal reflux disease)    • Seasonal allergies        Past Surgical History  Past Surgical History:   Procedure Laterality Date   • DENTAL PROCEDURE     • HERNIA REPAIR     • SINUS SURGERY     • TONSILLECTOMY     • VARICOCELE EXCISION     • VASECTOMY REVERSAL         Medications    Current Outpatient Medications:   •  brompheniramine-pseudoephedrine-DM 30-2-10 MG/5ML syrup, Take 10 mL by mouth 4 (Four) Times a Day As Needed for Congestion or Cough., Disp: 200 mL, Rfl: 0  •  cetirizine (zyrTEC) 10 MG tablet, Take 1 tablet by mouth Daily., Disp: 90 tablet, Rfl: 3  •  diclofenac (VOLTAREN) 1 % gel gel, Apply 4 g topically 4 (Four) Times a Day As Needed (joint pain)., Disp: 100 g, Rfl: 5  •  famotidine  "(Pepcid) 40 MG tablet, Take 1 tablet by mouth Daily., Disp: 90 tablet, Rfl: 3  •  fluticasone (FLONASE) 50 MCG/ACT nasal spray, , Disp: , Rfl:   •  PARoxetine (PAXIL) 20 MG tablet, Take 1 tablet by mouth Every Night., Disp: 30 tablet, Rfl: 11  •  raNITIdine (ZANTAC) 300 MG tablet, , Disp: , Rfl:     Allergies  No Known Allergies    Social History  Social History     Socioeconomic History   • Marital status:      Spouse name: Not on file   • Number of children: Not on file   • Years of education: Not on file   • Highest education level: Not on file   Tobacco Use   • Smoking status: Never Smoker   • Smokeless tobacco: Never Used   Substance and Sexual Activity   • Alcohol use: No   • Drug use: No   • Sexual activity: Defer       Family History  He has + family history of prostate cancer in grandfather      Review of Systems  Constitutional: No fevers or chills  Skin: Negative for rash  Endocrine: No heat/cold intolerance   Cardiovascular: Negative for chest pain or dyspnea on exertion  Respiratory: Negative for shortness of breath or wheezing  Gastrointestinal: No constipation, nausea or vomiting  Genitourinary: Negative for new lower urinary tract symptoms, current gross hematuria or dysuria.  Musculoskeletal: No flank pain  Neurological:  Negative for frequent headaches or dizziness  Lymph/Heme: Negative for leg swelling or calf pain.      Physical Exam  Visit Vitals  Temp 98 °F (36.7 °C) (Temporal)   Ht 170.2 cm (67\")   Wt 88 kg (194 lb)   BMI 30.38 kg/m²     Constitutional: NAD, WDWN.   HEENT: NCAT. Conjunctivae normal.  MMM.    Cardiovascular: Regular rate.  Pulmonary/Chest: Respirations are even and non-labored bilaterally.  Abdominal: Soft. No distension, tenderness, masses or guarding. No CVA tenderness.  Neurological: A + O x 3.  Cranial Nerves II-XII grossly intact. Normal gait.  Extremities: KEREN x 4, Warm. No clubbing.  No cyanosis.    Skin: Pink, warm and dry.  No rashes noted.  Psychiatric:  Normal " mood and affect    Genitourinary bilateral small to medium sized hydroceles.    Labs  No results found for: PSA    Lab Results   Component Value Date    GLUCOSE 91 03/13/2017    CALCIUM 9.7 04/23/2018     04/23/2018    K 4.5 04/23/2018    CO2 29.0 04/23/2018     04/23/2018    BUN 12 04/23/2018    CREATININE 0.80 04/23/2018    EGFRIFAFRI 129 04/23/2018    EGFRIFNONA 107 04/23/2018    BCR 15.0 04/23/2018    ANIONGAP 11.7 03/13/2017       Lab Results   Component Value Date    WBC 9.77 03/13/2017    HGB 14.5 03/13/2017    HCT 43.0 03/13/2017    MCV 88.8 03/13/2017     03/13/2017       Radiologic Studies         Assessment  Mr. Nova is a 45 y.o. male who presents with premature ejaculation.  He has not had any social or environmental changes.  He denies anxiety or depression.  He denies any ongoing marital discord.  He has experienced great improvement with SSRI, though now he sometimes has ejaculation that is delayed too long.  He also complains of new scrotal swelling and has likely bilateral hydroceles.  He has a history of vasectomy and vasectomy reversal.    Plan  1.  Decrease Paroxetine to 10 mg QHS   2.  FU in 3 mo to discuss hydrocelectomy bilateral and vasectomy; scrotal US prior      Titus Mulligan MD

## 2020-10-01 ENCOUNTER — TELEPHONE (OUTPATIENT)
Dept: INTERNAL MEDICINE | Facility: CLINIC | Age: 45
End: 2020-10-01

## 2020-10-01 ENCOUNTER — OFFICE VISIT (OUTPATIENT)
Dept: UROLOGY | Facility: CLINIC | Age: 45
End: 2020-10-01

## 2020-10-01 ENCOUNTER — HOSPITAL ENCOUNTER (OUTPATIENT)
Dept: ULTRASOUND IMAGING | Facility: HOSPITAL | Age: 45
Discharge: HOME | End: 2020-10-01
Admitting: UROLOGY

## 2020-10-01 VITALS
OXYGEN SATURATION: 98 % | HEART RATE: 64 BPM | WEIGHT: 188 LBS | SYSTOLIC BLOOD PRESSURE: 130 MMHG | BODY MASS INDEX: 27.85 KG/M2 | DIASTOLIC BLOOD PRESSURE: 82 MMHG | HEIGHT: 69 IN | TEMPERATURE: 97.9 F

## 2020-10-01 DIAGNOSIS — F52.4 PREMATURE EJACULATION: Primary | ICD-10-CM

## 2020-10-01 DIAGNOSIS — N43.3 HYDROCELE, UNSPECIFIED HYDROCELE TYPE: ICD-10-CM

## 2020-10-01 DIAGNOSIS — G47.33 OSA (OBSTRUCTIVE SLEEP APNEA): Primary | ICD-10-CM

## 2020-10-01 DIAGNOSIS — N50.89 SCROTAL SWELLING: ICD-10-CM

## 2020-10-01 PROCEDURE — 76870 US EXAM SCROTUM: CPT

## 2020-10-01 PROCEDURE — 99214 OFFICE O/P EST MOD 30 MIN: CPT | Performed by: UROLOGY

## 2020-10-01 RX ORDER — SODIUM CHLORIDE 9 MG/ML
100 INJECTION, SOLUTION INTRAVENOUS CONTINUOUS
Status: CANCELLED | OUTPATIENT
Start: 2020-10-01

## 2020-10-01 NOTE — TELEPHONE ENCOUNTER
"Patient has called the office requesting a new \"sleep doctor.\"  He states that he knows Dr. Perales personally (Dr. Perales is an ENT, but does treat Sleep Apnea issues) and would like a new referral to him.  We referred [facility] to [facility] [place] back in 2018, but patient states Dr. Perales is now coming to [place] on Fridays and he would like to establish care with him.    Can you place a new order for this?    Thanks!  "

## 2020-10-02 ENCOUNTER — APPOINTMENT (OUTPATIENT)
Dept: ULTRASOUND IMAGING | Facility: HOSPITAL | Age: 45
End: 2020-10-02

## 2020-10-06 ENCOUNTER — RESULTS ENCOUNTER (OUTPATIENT)
Dept: UROLOGY | Facility: CLINIC | Age: 45
End: 2020-10-06

## 2020-10-06 DIAGNOSIS — N43.3 HYDROCELE, UNSPECIFIED HYDROCELE TYPE: ICD-10-CM

## 2020-10-23 ENCOUNTER — APPOINTMENT (OUTPATIENT)
Dept: PREADMISSION TESTING | Facility: HOSPITAL | Age: 45
End: 2020-10-23

## 2020-10-23 ENCOUNTER — ANESTHESIA EVENT (OUTPATIENT)
Dept: PERIOP | Facility: HOSPITAL | Age: 45
End: 2020-10-23

## 2020-10-23 VITALS — HEIGHT: 69 IN | WEIGHT: 198 LBS | BODY MASS INDEX: 29.33 KG/M2

## 2020-10-23 DIAGNOSIS — Z01.818 PREOP TESTING: Primary | ICD-10-CM

## 2020-10-23 LAB
ANION GAP SERPL CALCULATED.3IONS-SCNC: 8 MMOL/L (ref 5–15)
BUN SERPL-MCNC: 14 MG/DL (ref 6–20)
BUN/CREAT SERPL: 14.4 (ref 7–25)
CALCIUM SPEC-SCNC: 9.7 MG/DL (ref 8.6–10.5)
CHLORIDE SERPL-SCNC: 102 MMOL/L (ref 98–107)
CO2 SERPL-SCNC: 27 MMOL/L (ref 22–29)
CREAT SERPL-MCNC: 0.97 MG/DL (ref 0.76–1.27)
DEPRECATED RDW RBC AUTO: 42 FL (ref 37–54)
ERYTHROCYTE [DISTWIDTH] IN BLOOD BY AUTOMATED COUNT: 12.8 % (ref 12.3–15.4)
GFR SERPL CREATININE-BSD FRML MDRD: 84 ML/MIN/1.73
GLUCOSE SERPL-MCNC: 116 MG/DL (ref 65–99)
HBA1C MFR BLD: 5.7 % (ref 4.8–5.6)
HCT VFR BLD AUTO: 46.6 % (ref 37.5–51)
HGB BLD-MCNC: 15.3 G/DL (ref 13–17.7)
MCH RBC QN AUTO: 29.3 PG (ref 26.6–33)
MCHC RBC AUTO-ENTMCNC: 32.8 G/DL (ref 31.5–35.7)
MCV RBC AUTO: 89.3 FL (ref 79–97)
PLATELET # BLD AUTO: 237 10*3/MM3 (ref 140–450)
PMV BLD AUTO: 11 FL (ref 6–12)
POTASSIUM SERPL-SCNC: 4.1 MMOL/L (ref 3.5–5.2)
RBC # BLD AUTO: 5.22 10*6/MM3 (ref 4.14–5.8)
SARS-COV-2 RNA RESP QL NAA+PROBE: NOT DETECTED
SODIUM SERPL-SCNC: 137 MMOL/L (ref 136–145)
WBC # BLD AUTO: 4.95 10*3/MM3 (ref 3.4–10.8)

## 2020-10-23 PROCEDURE — 80048 BASIC METABOLIC PNL TOTAL CA: CPT | Performed by: UROLOGY

## 2020-10-23 PROCEDURE — 36415 COLL VENOUS BLD VENIPUNCTURE: CPT | Performed by: UROLOGY

## 2020-10-23 PROCEDURE — C9803 HOPD COVID-19 SPEC COLLECT: HCPCS | Performed by: UROLOGY

## 2020-10-23 PROCEDURE — 83036 HEMOGLOBIN GLYCOSYLATED A1C: CPT | Performed by: UROLOGY

## 2020-10-23 PROCEDURE — 85027 COMPLETE CBC AUTOMATED: CPT | Performed by: UROLOGY

## 2020-10-23 PROCEDURE — U0004 COV-19 TEST NON-CDC HGH THRU: HCPCS

## 2020-10-27 ENCOUNTER — ANESTHESIA (OUTPATIENT)
Dept: PERIOP | Facility: HOSPITAL | Age: 45
End: 2020-10-27

## 2020-10-27 ENCOUNTER — HOSPITAL ENCOUNTER (OUTPATIENT)
Facility: HOSPITAL | Age: 45
Setting detail: HOSPITAL OUTPATIENT SURGERY
Discharge: HOME | End: 2020-10-27
Attending: UROLOGY | Admitting: UROLOGY

## 2020-10-27 VITALS
TEMPERATURE: 97.8 F | OXYGEN SATURATION: 94 % | RESPIRATION RATE: 18 BRPM | SYSTOLIC BLOOD PRESSURE: 150 MMHG | HEART RATE: 88 BPM | DIASTOLIC BLOOD PRESSURE: 91 MMHG

## 2020-10-27 DIAGNOSIS — N43.3 HYDROCELE, UNSPECIFIED HYDROCELE TYPE: ICD-10-CM

## 2020-10-27 PROBLEM — K40.90 REDUCIBLE LEFT INGUINAL HERNIA: Status: ACTIVE | Noted: 2020-10-27

## 2020-10-27 PROBLEM — K40.30 INCARCERATED RIGHT INGUINAL HERNIA: Status: ACTIVE | Noted: 2020-10-27

## 2020-10-27 PROCEDURE — 25010000002 FENTANYL CITRATE (PF) 100 MCG/2ML SOLUTION: Performed by: NURSE ANESTHETIST, CERTIFIED REGISTERED

## 2020-10-27 PROCEDURE — 88305 TISSUE EXAM BY PATHOLOGIST: CPT | Performed by: UROLOGY

## 2020-10-27 PROCEDURE — 25010000003 CEFAZOLIN SODIUM-DEXTROSE 2-3 GM-%(50ML) RECONSTITUTED SOLUTION: Performed by: UROLOGY

## 2020-10-27 PROCEDURE — 88302 TISSUE EXAM BY PATHOLOGIST: CPT | Performed by: UROLOGY

## 2020-10-27 PROCEDURE — 55250 REMOVAL OF SPERM DUCT(S): CPT | Performed by: UROLOGY

## 2020-10-27 PROCEDURE — 25010000002 MIDAZOLAM PER 1MG: Performed by: NURSE ANESTHETIST, CERTIFIED REGISTERED

## 2020-10-27 PROCEDURE — 49507 PRP I/HERN INIT BLOCK >5 YR: CPT | Performed by: SURGERY

## 2020-10-27 PROCEDURE — 25010000002 ONDANSETRON PER 1 MG: Performed by: NURSE ANESTHETIST, CERTIFIED REGISTERED

## 2020-10-27 PROCEDURE — 94799 UNLISTED PULMONARY SVC/PX: CPT

## 2020-10-27 PROCEDURE — C1781 MESH (IMPLANTABLE): HCPCS | Performed by: UROLOGY

## 2020-10-27 PROCEDURE — 25010000002 HYDROMORPHONE 1 MG/ML SOLUTION

## 2020-10-27 PROCEDURE — 55041 REMOVAL OF HYDROCELES: CPT | Performed by: UROLOGY

## 2020-10-27 PROCEDURE — 25010000002 PROPOFOL 200 MG/20ML EMULSION: Performed by: NURSE ANESTHETIST, CERTIFIED REGISTERED

## 2020-10-27 PROCEDURE — 25010000002 DEXAMETHASONE PER 1 MG: Performed by: NURSE ANESTHETIST, CERTIFIED REGISTERED

## 2020-10-27 DEVICE — MESH PROLN 3X6: Type: IMPLANTABLE DEVICE | Site: GROIN | Status: FUNCTIONAL

## 2020-10-27 RX ORDER — ONDANSETRON 2 MG/ML
INJECTION INTRAMUSCULAR; INTRAVENOUS AS NEEDED
Status: DISCONTINUED | OUTPATIENT
Start: 2020-10-27 | End: 2020-10-27 | Stop reason: SURG

## 2020-10-27 RX ORDER — LORAZEPAM 2 MG/ML
1 INJECTION INTRAMUSCULAR
Status: DISCONTINUED | OUTPATIENT
Start: 2020-10-27 | End: 2020-10-27 | Stop reason: HOSPADM

## 2020-10-27 RX ORDER — BUPIVACAINE HYDROCHLORIDE 5 MG/ML
INJECTION, SOLUTION EPIDURAL; INTRACAUDAL AS NEEDED
Status: DISCONTINUED | OUTPATIENT
Start: 2020-10-27 | End: 2020-10-27 | Stop reason: HOSPADM

## 2020-10-27 RX ORDER — DOCUSATE SODIUM 100 MG/1
100 CAPSULE, LIQUID FILLED ORAL 2 TIMES DAILY
Qty: 15 CAPSULE | Refills: 1 | Status: SHIPPED | OUTPATIENT
Start: 2020-10-27 | End: 2021-03-23

## 2020-10-27 RX ORDER — OXYCODONE HYDROCHLORIDE AND ACETAMINOPHEN 5; 325 MG/1; MG/1
1 TABLET ORAL ONCE
Status: COMPLETED | OUTPATIENT
Start: 2020-10-27 | End: 2020-10-27

## 2020-10-27 RX ORDER — MIDAZOLAM HYDROCHLORIDE 2 MG/2ML
INJECTION, SOLUTION INTRAMUSCULAR; INTRAVENOUS AS NEEDED
Status: DISCONTINUED | OUTPATIENT
Start: 2020-10-27 | End: 2020-10-27 | Stop reason: SURG

## 2020-10-27 RX ORDER — PROMETHAZINE HYDROCHLORIDE 25 MG/1
25 TABLET ORAL ONCE AS NEEDED
Status: DISCONTINUED | OUTPATIENT
Start: 2020-10-27 | End: 2020-10-27 | Stop reason: HOSPADM

## 2020-10-27 RX ORDER — ACETAMINOPHEN 325 MG/1
650 TABLET ORAL EVERY 6 HOURS
Qty: 30 TABLET | Refills: 0 | Status: SHIPPED | OUTPATIENT
Start: 2020-10-27 | End: 2020-10-30

## 2020-10-27 RX ORDER — DEXAMETHASONE SODIUM PHOSPHATE 4 MG/ML
INJECTION, SOLUTION INTRA-ARTICULAR; INTRALESIONAL; INTRAMUSCULAR; INTRAVENOUS; SOFT TISSUE AS NEEDED
Status: DISCONTINUED | OUTPATIENT
Start: 2020-10-27 | End: 2020-10-27 | Stop reason: SURG

## 2020-10-27 RX ORDER — FENTANYL CITRATE 50 UG/ML
INJECTION, SOLUTION INTRAMUSCULAR; INTRAVENOUS AS NEEDED
Status: DISCONTINUED | OUTPATIENT
Start: 2020-10-27 | End: 2020-10-27 | Stop reason: SURG

## 2020-10-27 RX ORDER — MAGNESIUM HYDROXIDE 1200 MG/15ML
LIQUID ORAL AS NEEDED
Status: DISCONTINUED | OUTPATIENT
Start: 2020-10-27 | End: 2020-10-27 | Stop reason: HOSPADM

## 2020-10-27 RX ORDER — DIAPER,BRIEF,INFANT-TODD,DISP
EACH MISCELLANEOUS AS NEEDED
Status: DISCONTINUED | OUTPATIENT
Start: 2020-10-27 | End: 2020-10-27 | Stop reason: HOSPADM

## 2020-10-27 RX ORDER — OXYCODONE HYDROCHLORIDE 5 MG/1
5 TABLET ORAL EVERY 6 HOURS PRN
Qty: 5 TABLET | Refills: 0 | Status: SHIPPED | OUTPATIENT
Start: 2020-10-27 | End: 2020-10-29

## 2020-10-27 RX ORDER — LIDOCAINE HYDROCHLORIDE 20 MG/ML
INJECTION, SOLUTION INTRAVENOUS AS NEEDED
Status: DISCONTINUED | OUTPATIENT
Start: 2020-10-27 | End: 2020-10-27 | Stop reason: SURG

## 2020-10-27 RX ORDER — MEPERIDINE HYDROCHLORIDE 25 MG/ML
12.5 INJECTION INTRAMUSCULAR; INTRAVENOUS; SUBCUTANEOUS
Status: DISCONTINUED | OUTPATIENT
Start: 2020-10-27 | End: 2020-10-27 | Stop reason: HOSPADM

## 2020-10-27 RX ORDER — PROPOFOL 10 MG/ML
INJECTION, EMULSION INTRAVENOUS AS NEEDED
Status: DISCONTINUED | OUTPATIENT
Start: 2020-10-27 | End: 2020-10-27 | Stop reason: SURG

## 2020-10-27 RX ORDER — CEFAZOLIN SODIUM 2 G/50ML
2 SOLUTION INTRAVENOUS ONCE
Status: COMPLETED | OUTPATIENT
Start: 2020-10-27 | End: 2020-10-27

## 2020-10-27 RX ORDER — ONDANSETRON 2 MG/ML
4 INJECTION INTRAMUSCULAR; INTRAVENOUS ONCE AS NEEDED
Status: DISCONTINUED | OUTPATIENT
Start: 2020-10-27 | End: 2020-10-27 | Stop reason: HOSPADM

## 2020-10-27 RX ORDER — SODIUM CHLORIDE 9 MG/ML
100 INJECTION, SOLUTION INTRAVENOUS CONTINUOUS
Status: DISCONTINUED | OUTPATIENT
Start: 2020-10-27 | End: 2020-10-27 | Stop reason: HOSPADM

## 2020-10-27 RX ADMIN — SODIUM CHLORIDE 100 ML/HR: 9 INJECTION, SOLUTION INTRAVENOUS at 07:21

## 2020-10-27 RX ADMIN — FENTANYL CITRATE 100 MCG: 50 INJECTION INTRAMUSCULAR; INTRAVENOUS at 09:46

## 2020-10-27 RX ADMIN — Medication 0.5 MG: at 13:30

## 2020-10-27 RX ADMIN — MIDAZOLAM HYDROCHLORIDE 2 MG: 1 INJECTION, SOLUTION INTRAMUSCULAR; INTRAVENOUS at 09:46

## 2020-10-27 RX ADMIN — Medication 0.5 MG: at 12:57

## 2020-10-27 RX ADMIN — HYDROMORPHONE HYDROCHLORIDE 0.5 MG: 1 INJECTION, SOLUTION INTRAMUSCULAR; INTRAVENOUS; SUBCUTANEOUS at 12:57

## 2020-10-27 RX ADMIN — HYDROMORPHONE HYDROCHLORIDE 0.5 MG: 1 INJECTION, SOLUTION INTRAMUSCULAR; INTRAVENOUS; SUBCUTANEOUS at 13:30

## 2020-10-27 RX ADMIN — OXYCODONE HYDROCHLORIDE AND ACETAMINOPHEN 1 TABLET: 5; 325 TABLET ORAL at 14:24

## 2020-10-27 RX ADMIN — FENTANYL CITRATE 50 MCG: 50 INJECTION INTRAMUSCULAR; INTRAVENOUS at 11:58

## 2020-10-27 RX ADMIN — CEFAZOLIN SODIUM 2 G: 2 SOLUTION INTRAVENOUS at 09:46

## 2020-10-27 RX ADMIN — PROPOFOL 200 MG: 10 INJECTION, EMULSION INTRAVENOUS at 09:46

## 2020-10-27 RX ADMIN — SODIUM CHLORIDE: 9 INJECTION, SOLUTION INTRAVENOUS at 11:55

## 2020-10-27 RX ADMIN — Medication 0.5 MG: at 13:12

## 2020-10-27 RX ADMIN — HYDROMORPHONE HYDROCHLORIDE 0.5 MG: 1 INJECTION, SOLUTION INTRAMUSCULAR; INTRAVENOUS; SUBCUTANEOUS at 13:12

## 2020-10-27 RX ADMIN — DEXAMETHASONE SODIUM PHOSPHATE 8 MG: 4 INJECTION, SOLUTION INTRAMUSCULAR; INTRAVENOUS at 09:46

## 2020-10-27 RX ADMIN — LIDOCAINE HYDROCHLORIDE 100 MG: 20 INJECTION, SOLUTION INTRAVENOUS at 09:46

## 2020-10-27 RX ADMIN — FENTANYL CITRATE 50 MCG: 50 INJECTION INTRAMUSCULAR; INTRAVENOUS at 11:00

## 2020-10-27 RX ADMIN — ONDANSETRON 4 MG: 2 INJECTION INTRAMUSCULAR; INTRAVENOUS at 09:46

## 2020-10-27 NOTE — ANESTHESIA PROCEDURE NOTES
Airway  Urgency: elective    Date/Time: 10/27/2020 9:40 AM  Airway not difficult    General Information and Staff    Patient location during procedure: OR  CRNA: Luke Forrest CRNA    Indications and Patient Condition  Indications for airway management: airway protection    Preoxygenated: yes  Mask difficulty assessment: 1 - vent by mask    Final Airway Details  Final airway type: supraglottic airway      Successful airway: unique  Size 5    Number of attempts at approach: 1    Additional Comments  LMA placed easily without trauma. Dentition and lips as noted pre-induction. Factory cuff pressure to minimal occlusive pressure.

## 2020-10-27 NOTE — ANESTHESIA POSTPROCEDURE EVALUATION
Patient: Salvatore Nova III    Procedure Summary     Date: 10/27/20 Room / Location: [facility] OR  /  [facility]    Anesthesia Start: 0936 Anesthesia Stop: 1230    Procedures:       HYDROCELECTOMY (Bilateral Scrotum)      VASECTOMY (Bilateral Scrotum)      INGUINAL HERNIA REPAIR (Right Groin) Diagnosis:       Hydrocele, unspecified hydrocele type      (Hydrocele, unspecified hydrocele type [N43.3])    Surgeon: Titus Mulligan MD Provider: Luke Forrets CRNA    Anesthesia Type: general ASA Status: 2          Anesthesia Type: general    Vitals  Vitals Value Taken Time   /93 10/27/20 1355   Temp 97.9 °F (36.6 °C) 10/27/20 1355   Pulse 73 10/27/20 1359   Resp 16 10/27/20 1355   SpO2 96 % 10/27/20 1359   Vitals shown include unvalidated device data.        Post Anesthesia Care and Evaluation    Patient location during evaluation: PACU  Patient participation: complete - patient participated  Level of consciousness: awake and alert and awake  Pain score: 3  Pain management: adequate  Airway patency: patent  Anesthetic complications: No anesthetic complications  PONV Status: controlled  Cardiovascular status: acceptable, hemodynamically stable and stable  Respiratory status: acceptable and nasal cannula  Hydration status: acceptable

## 2020-10-27 NOTE — ANESTHESIA PREPROCEDURE EVALUATION
Anesthesia Evaluation     Patient summary reviewed and Nursing notes reviewed   history of anesthetic complications: prolonged sedation  NPO Solid Status: > 8 hours  NPO Liquid Status: > 8 hours           Airway   Mallampati: II  TM distance: >3 FB  Neck ROM: full  No difficulty expected  Dental - normal exam     Pulmonary - normal exam   (+) sleep apnea,   Cardiovascular - normal exam    Dysrhythmias:  wpw.      Neuro/Psych  GI/Hepatic/Renal/Endo    (+)  GERD well controlled,      Musculoskeletal     Abdominal  - normal exam   Substance History      OB/GYN          Other   arthritis,                    Anesthesia Plan    ASA 2     general     intravenous induction     Anesthetic plan, all risks, benefits, and alternatives have been provided, discussed and informed consent has been obtained with: patient.    Plan discussed with CRNA.

## 2020-10-28 ENCOUNTER — TELEPHONE (OUTPATIENT)
Dept: UROLOGY | Facility: CLINIC | Age: 45
End: 2020-10-28

## 2020-10-28 ENCOUNTER — TELEPHONE (OUTPATIENT)
Dept: SURGERY | Facility: CLINIC | Age: 45
End: 2020-10-28

## 2020-10-29 ENCOUNTER — TELEPHONE (OUTPATIENT)
Dept: UROLOGY | Facility: CLINIC | Age: 45
End: 2020-10-29

## 2020-10-29 RX ORDER — OXYCODONE AND ACETAMINOPHEN 7.5; 325 MG/1; MG/1
1 TABLET ORAL EVERY 4 HOURS PRN
Qty: 25 TABLET | Refills: 0 | Status: SHIPPED | OUTPATIENT
Start: 2020-10-29 | End: 2021-02-08 | Stop reason: HOSPADM

## 2020-10-31 LAB
LAB AP CASE REPORT: NORMAL
PATH REPORT.FINAL DX SPEC: NORMAL

## 2020-11-03 NOTE — OP NOTE
PROCEDURE DATE: 10/27/2020    SURGEON: Hiwot Buckley MD, FACS    PREOPERATIVE DIAGNOSIS: Bilateral hydrocele    POSTOPERATIVE DIAGNOSIS: 1.) Incarcerated right inguinal hernia 2.)  Reducible left inguinal hernia    PROCEDURE: Repair of an incarcerated right inguinal hernia with mesh    ANESTHESIA: GETA    EBL: Minimal    SPECIMENS:      ID Source Type Tests Collected By Collected At Frozen?      A Vas Deferens, Right Tissue · TISSUE PATHOLOGY EXAM   Malina Lombardi RN 10/27/20 1042 No     This specimen was not marked as sent.    B Omentum Tissue · TISSUE PATHOLOGY EXAM   Malina Lombardi RN 10/27/20 1131 No     This specimen was not marked as sent.    C Vas Deferens, Left Tissue · TISSUE PATHOLOGY EXAM   Malina Lombardi RN 10/27/20 1207 No     This specimen was not marked as sent.         INDICATIONS FOR THE PROCEDURE: Mr. Nova is a 45-year-old gentleman, who is under the care of Dr. Titus Mulligan for management of moderate sized bilateral hydroceles, and desire for permanent elective sterilization with bilateral vasectomy.  The patient was undergoing bilateral hydrocelectomy and bilateral vasectomy by Dr. Mulligan, at which time a suspected right inguinal hernia was identified.  Due to these findings, and intraoperative general surgery consultation was requested.  At the time of my evaluation, the patient was under the influence of general anesthesia, prepped, draped, and on the operating room table in the midst of Dr. Mulligan's procedure.    DESCRIPTION OF THE PROCEDURE: Mr. Nova was seen supine on the operating table, under the influence of general anesthesia.  The lower abdomen, penis, and scrotum had been prepped into the sterile field.  A vertical incision had been made in the midline raphae of the scrotum, through which the bilateral hydroceles had been exposed.  On the right, the tunica vaginalis had been previously incised by Dr. Mulligan, and the hydrocele drained.  The spermatic cord had been dissected away from the  hydrocele as well as a large pedicle of what appeared to be omentum which had herniated into the right hemiscrotum.  Initially, I attempted to reduce the herniated omentum back into the peritoneal cavity, and despite the influence of general anesthesia, this was not successful due to the incarcerated nature of the hernia.  Given this finding, it was felt that it was best to proceed with repair of the incarcerated right inguinal hernia.  It was unclear at the time of my evaluation how long the hernia had been incarcerated given that it had not been identified on the patient's preoperative office evaluation by Dr. Mulligan, or on his preoperative ultrasound.    I elected to proceed with a standard open approach. A local field block was produced by raising skin wheals along the proposed skin incision is well as along the vertical line lateral to that as well as a horizontal line superior to it.  A skin wheal was also raised one centimeter lateral and superior to the anterior superior iliac spine and a fascial injection of lidocaine was made to block the ilioinguinal nerve.  Additional local anesthesia was injected throughout the procedure under the external oblique aponeurosis, at the internal ring, and as needed.      A skin crease incision was then made using a 10 blade knife and deepened through Ryan's and Camper's fascia using the Bovie electrocautery.  Hemostasis was ensured along the way.  The aponeurosis of the external oblique was encountered and was cleaned in order to expose the external ring.  An incision was then made in the midportion of the external oblique aponeurosis following the direction of its fibers.  Once this was done flaps of the external oblique were developed cephalad and inferiorly taking care to avoid the ilioinguinal nerve.    The spermatic cord was identified and was gently dissected free at the level of the pubic tubercle and encircled with a Penrose drain.  Attention was then directed to  the anteromedial aspect of the cord, where the remaining portions of an indirect hernia sac were identified.  The sac was carefully dissected free of the cord down to the level of the internal ring.  The vas and the testicular vessels were identified and protected from harm.  The sac was opened and despite this, the herniated omentum was unable to be completely reduced.  Approximately 30% of the incarcerated omentum was reduced with success, and at that point I elected to amputate the remaining portion.  This was accomplished by dividing the omentum between clamps with the electrocautery.  Following this, the proximal end of the omentum was secured with suture ligatures.  Portion of the omentum that had been divided was subsequently passed off the field as specimen.  The proximal portion of the omentum was then fully reduced into the peritoneal cavity.     A finger was passed into the peritoneal cavity and the floor of the inguinal canal was assessed and found to be weak.  Attention was then turned to the floor of the inguinal canal, which appeared to be grossly weakened without a clear direct hernia defect.  A polypropylene mesh was then cut to the appropriate size with an oval medial portion of the longitudinal lateral opening.  Beginning at the pubic tubercle, the mesh was sutured to the inguinal ligament inferiorly, and the conjoined tendon superiorly using an interrupted 0 Prolene suture.  Care was taken to ensure the mesh was placed in a relaxed fashion avoiding excessive tension, and that no neurovascular structures were caught in the repair.  Laterally, the tails of mesh were crossed and the internal ring was re-created allowing for the passage of my fifth finger tip.    Hemostasis was again checked and found to be excellent.  The penrose drain was removed.  The external oblique aponeurosis was closed with a running suture of 2-0 Vicryl.  Care was again taken to avoid the ilioinguinal nerve.  Ryan's fascia  was then closed with running 3-0 Vicryl suture and the skin was closed using a 4-0 Vicryl placed in a subcuticular fashion.  Dermabond was applied to the wound.  Additional 0.25% Marcaine was injected for postoperative analgesia.  The testis was then pulled back down into its anatomic position in the scrotum.      At the request of Dr. Mulligan, I remained scrubbed in the procedure as he opened the left hemiscrotum to address that hydrocele.  He was concerned that he may encounter another hernia and require additional assistance.  Ultimately, a left-sided inguinal hernia containing fat was identified, however, on my examination, this was not only substantially smaller, it was easily reducible.  I discussed this at length with Dr. Mulligan and I did not feel that it was in the patient's best interest to proceed with a left-sided inguinal hernia repair at this time.  It was my feeling that due to the reducible nature of the hernia, it did not constitute an emergency procedure that should be performed without obtaining proper informed consent from the patient.  At that point, I left the operating room, and Dr. Mulligan reassumed sole care of the patient.      Dr. Buckley was present and scrubbed for the entire right inguinal hernia repair portion of the procedure.  At the conclusion of that procedure, all sponge, needle, and instrument  counts were correct.    Following this, I personally spoke with the patient's wife regarding the need for additional procedures, the indications for those procedures, and the rationale behind not proceeding with bilateral hernia repairs.  I will plan to see the patient back in my office in 1 week for routine postoperative follow-up.

## 2020-11-05 ENCOUNTER — OFFICE VISIT (OUTPATIENT)
Dept: SURGERY | Facility: CLINIC | Age: 45
End: 2020-11-05

## 2020-11-05 VITALS
HEART RATE: 89 BPM | WEIGHT: 189 LBS | OXYGEN SATURATION: 99 % | SYSTOLIC BLOOD PRESSURE: 132 MMHG | DIASTOLIC BLOOD PRESSURE: 88 MMHG | TEMPERATURE: 98 F | HEIGHT: 69 IN | BODY MASS INDEX: 27.99 KG/M2 | RESPIRATION RATE: 18 BRPM

## 2020-11-05 DIAGNOSIS — K40.90 REDUCIBLE LEFT INGUINAL HERNIA: ICD-10-CM

## 2020-11-05 DIAGNOSIS — Z98.890 S/P INGUINAL HERNIA REPAIR USING SYNTHETIC PATCH: ICD-10-CM

## 2020-11-05 DIAGNOSIS — Z87.19 S/P INGUINAL HERNIA REPAIR USING SYNTHETIC PATCH: ICD-10-CM

## 2020-11-05 DIAGNOSIS — K40.30 INCARCERATED RIGHT INGUINAL HERNIA: Primary | ICD-10-CM

## 2020-11-05 PROCEDURE — 99024 POSTOP FOLLOW-UP VISIT: CPT | Performed by: SURGERY

## 2020-11-05 NOTE — PROGRESS NOTES
Subjective   Salvatore Nova III is a 45 y.o. male.   Chief Complaint   Patient presents with   • Post-op Follow-up     inguinal hernia repair       History of Present Illness   Mr. Nova comes the office today for follow-up after recently undergoing repair of an incarcerated right inguinal hernia.  This was discovered incidentally during a scheduled procedure for bilateral vasectomy, and bilateral hydrocelectomy, which is being performed by Dr. Mulligan.  Over the course of the operation, it was determined that the patient also had bilateral inguinal hernias, with the right side being incarcerated.  As such, I was asked to see the patient for intraoperative consultation, and went on to repair the right-sided incarcerated inguinal hernia.     The patient reports some discomfort in the scrotal area and along the right groin incision.  He complains of firmness beneath the right groin incision.  He denies redness or drainage.  He denies fevers or chills.      The following portions of the patient's history were reviewed and updated as appropriate: allergies, current medications, past family history, past medical history, past social history, past surgical history and problem list.    Review of Systems   Constitutional: Negative for chills, fever and unexpected weight change.   HENT: Negative for trouble swallowing and voice change.    Eyes: Negative for visual disturbance.   Respiratory: Negative for apnea, cough, chest tightness, shortness of breath and wheezing.    Cardiovascular: Negative for chest pain, palpitations and leg swelling.   Gastrointestinal: Negative for abdominal distention, abdominal pain, anal bleeding, blood in stool, constipation, diarrhea, nausea, rectal pain and vomiting.   Endocrine: Negative for cold intolerance and heat intolerance.   Genitourinary: Negative for difficulty urinating, dysuria, flank pain, scrotal swelling and testicular pain.   Musculoskeletal: Negative for back pain, gait problem  "and joint swelling.   Skin: Negative for color change, rash and wound.   Neurological: Negative for dizziness, syncope, speech difficulty, weakness, numbness and headaches.   Hematological: Negative for adenopathy. Does not bruise/bleed easily.   Psychiatric/Behavioral: Negative for confusion. The patient is not nervous/anxious.        Objective    /88   Pulse 89   Temp 98 °F (36.7 °C)   Resp 18   Ht 175.3 cm (69\")   Wt 85.7 kg (189 lb)   SpO2 99%   BMI 27.91 kg/m²     Physical Exam  Constitutional:       Appearance: He is well-developed.   HENT:      Head: Normocephalic and atraumatic.   Cardiovascular:      Rate and Rhythm: Regular rhythm.   Pulmonary:      Effort: Pulmonary effort is normal.   Abdominal:      General: There is no distension.      Palpations: Abdomen is soft.      Tenderness: There is no abdominal tenderness.      Comments: Right inguinal incision is healing well without cellulitis or erythema.  There is a palpable healing ridge beneath the scar, as expected.  No significant swelling in the area.   Genitourinary:     Comments: Mild scrotal swelling noted.  Skin:     General: Skin is warm and dry.   Neurological:      Mental Status: He is alert and oriented to person, place, and time.   Psychiatric:         Behavior: Behavior normal.         Assessment/Plan   Diagnoses and all orders for this visit:    1. Incarcerated right inguinal hernia (Primary)    2. Reducible left inguinal hernia    3. S/P inguinal hernia repair using synthetic patch        Mr. Nova is a 45-year-old gentleman status post recent repair of an incarcerated right inguinal hernia.  This was done in combination with bilateral hydrocelectomy's, and a bilateral vasectomy.  I had a detailed discussion with the patient in the office today regarding the intraoperative findings that were different from what he had expected.  I explained the significance of an incarcerated hernia and its management.  We also discussed that " he has a left-sided inguinal hernia which was reducible at the time of surgery, and thus was not repaired.  All of his questions regarding the surgical procedure were answered to his satisfaction.  We discussed ongoing wound care and pain control.  I discussed with him his activity restrictions.  I will plan to see him back in 1month for his second follow-up visit.  We discussed the potential for added inguinal hernia repair in the future.  I would defer this until he has recovered from his most recent operation.  He knows to call me with any questions that may arise in the interim.

## 2020-11-24 DIAGNOSIS — F52.4 PREMATURE EJACULATION: ICD-10-CM

## 2020-11-24 RX ORDER — PAROXETINE 10 MG/1
TABLET, FILM COATED ORAL
Qty: 30 TABLET | Refills: 1 | Status: SHIPPED | OUTPATIENT
Start: 2020-11-24 | End: 2021-02-09

## 2020-11-30 ENCOUNTER — OFFICE VISIT (OUTPATIENT)
Dept: UROLOGY | Facility: CLINIC | Age: 45
End: 2020-11-30

## 2020-11-30 VITALS
BODY MASS INDEX: 28.44 KG/M2 | HEIGHT: 69 IN | DIASTOLIC BLOOD PRESSURE: 78 MMHG | RESPIRATION RATE: 16 BRPM | HEART RATE: 85 BPM | SYSTOLIC BLOOD PRESSURE: 120 MMHG | OXYGEN SATURATION: 98 % | WEIGHT: 192 LBS | TEMPERATURE: 98.2 F

## 2020-11-30 DIAGNOSIS — N43.3 HYDROCELE, UNSPECIFIED HYDROCELE TYPE: ICD-10-CM

## 2020-11-30 DIAGNOSIS — Z98.52 VASECTOMY STATUS: Primary | ICD-10-CM

## 2020-11-30 PROCEDURE — 99213 OFFICE O/P EST LOW 20 MIN: CPT | Performed by: UROLOGY

## 2020-11-30 RX ORDER — OMEPRAZOLE 40 MG/1
40 CAPSULE, DELAYED RELEASE ORAL EVERY EVENING
COMMUNITY
Start: 2020-11-20

## 2020-11-30 NOTE — PROGRESS NOTES
Chief Complaint  Premature ejaculation    HPI  Mr. Nova is a 45 y.o. male with history of vasectomy and reversal in 2007 who presents for follow-up of PE.  At last visit we adjusted his paroxetine.    He presents today for follow-up after his bilateral hydrocelectomy, right inguinal herniorrhaphy, and vasectomy.  He states overall he is doing well.  He has some right-sided lower abdominal/groin pain when he stretches his arm up, but overall has no complaints.  He denies any swelling or fevers.    To review,  + Hx of WPW; no past EP or ablation procedures; saw a cardiologist many years ago  Has an EKG yearly  He says erections are 10/10.   Partner is his wife,  11 years  He says PE has been going on for 1 year  He denies any changes in his marriage or social changes; no new partners  Denies anxiety or depression.  He is only other medical problem is GERD.     Past Medical History  Past Medical History:   Diagnosis Date   • GERD (gastroesophageal reflux disease)    • Gout    • Seasonal allergies    • Sleep apnea    • Misti-Parkinson-White (WPW) pattern seen on electrocardiography        Past Surgical History  Past Surgical History:   Procedure Laterality Date   • DENTAL PROCEDURE     • HERNIA REPAIR     • HYDROCELECTOMY Bilateral 10/27/2020    Procedure: HYDROCELECTOMY BILATERAL;  Surgeon: Titus Mulligan MD;  Location: [facility];  Service: Urology;  Laterality: Bilateral;   • INGUINAL HERNIA REPAIR Right 10/27/2020    Procedure: INGUINAL HERNIA REPAIR;  Surgeon: Titus Mulligan MD;  Location: [facility];  Service: Urology;  Laterality: Right;   • KNEE ARTHROSCOPY Bilateral    • SHOULDER SURGERY Right     SLAP   • SINUS SURGERY     • TONSILLECTOMY     • VARICOCELE EXCISION     • VASECTOMY     • VASECTOMY Bilateral 10/27/2020    Procedure: VASECTOMY BILATERAL;  Surgeon: Titus Mulligan MD;  Location: [facility];  Service: Urology;  Laterality: Bilateral;   • VASECTOMY REVERSAL          Medications    Current Outpatient Medications:   •  brompheniramine-pseudoephedrine-DM 30-2-10 MG/5ML syrup, Take 10 mL by mouth 4 (Four) Times a Day As Needed for Congestion or Cough., Disp: 200 mL, Rfl: 0  •  cetirizine (zyrTEC) 10 MG tablet, Take 1 tablet by mouth Daily., Disp: 90 tablet, Rfl: 3  •  diclofenac (VOLTAREN) 1 % gel gel, Apply 4 g topically 4 (Four) Times a Day As Needed (joint pain)., Disp: 100 g, Rfl: 5  •  docusate sodium (Colace) 100 MG capsule, Take 1 capsule by mouth 2 (Two) Times a Day if taking oxycodone, Disp: 15 capsule, Rfl: 1  •  famotidine (Pepcid) 40 MG tablet, Take 1 tablet by mouth Daily., Disp: 90 tablet, Rfl: 3  •  fluticasone (FLONASE) 50 MCG/ACT nasal spray, , Disp: , Rfl:   •  omeprazole (priLOSEC) 40 MG capsule, , Disp: , Rfl:   •  oxyCODONE-acetaminophen (Percocet) 7.5-325 MG per tablet, Take 1 tablet by mouth Every 4 (Four) Hours As Needed for Moderate Pain ., Disp: 25 tablet, Rfl: 0  •  PARoxetine (PAXIL) 10 MG tablet, TAKE ONE TABLET BY MOUTH ONCE NIGHTLY, Disp: 30 tablet, Rfl: 1    Allergies  No Known Allergies    Social History  Social History     Socioeconomic History   • Marital status:      Spouse name: Not on file   • Number of children: Not on file   • Years of education: Not on file   • Highest education level: Not on file   Tobacco Use   • Smoking status: Never Smoker   • Smokeless tobacco: Never Used   Substance and Sexual Activity   • Alcohol use: No   • Drug use: No   • Sexual activity: Defer       Family History  He has + family history of prostate cancer in grandfather      Review of Systems  Constitutional: No fevers or chills  Skin: Negative for rash  Endocrine: No heat/cold intolerance   Cardiovascular: Negative for chest pain or dyspnea on exertion  Respiratory: Negative for shortness of breath or wheezing  Gastrointestinal: No constipation, nausea or vomiting  Genitourinary: Negative for new lower urinary tract symptoms, current gross  "hematuria or dysuria.  Musculoskeletal: No flank pain  Neurological:  Negative for frequent headaches or dizziness  Lymph/Heme: Negative for leg swelling or calf pain.      Physical Exam  Visit Vitals  /78   Pulse 85   Temp 98.2 °F (36.8 °C)   Resp 16   Ht 175.3 cm (69\")   Wt 87.1 kg (192 lb)   SpO2 98%   BMI 28.35 kg/m²     Constitutional: NAD, WDWN.   HEENT: NCAT. Conjunctivae normal.  MMM.    Cardiovascular: Regular rate.  Pulmonary/Chest: Respirations are even and non-labored bilaterally.  Abdominal: Soft. No distension, tenderness, masses or guarding. No CVA tenderness.  Neurological: A + O x 3.  Cranial Nerves II-XII grossly intact. Normal gait.  Extremities: KEREN x 4, Warm. No clubbing.  No cyanosis.    Skin: Pink, warm and dry.  No rashes noted.  Psychiatric:  Normal mood and affect    Genitourinary -scrotal incision well-healed.  Hydroceles resolved.  Small hernia palpable on the left side.    Labs  No results found for: PSA    Lab Results   Component Value Date    GLUCOSE 116 (H) 10/23/2020    CALCIUM 9.7 10/23/2020     10/23/2020    K 4.1 10/23/2020    CO2 27.0 10/23/2020     10/23/2020    BUN 14 10/23/2020    CREATININE 0.97 10/23/2020    EGFRIFAFRI 129 04/23/2018    EGFRIFNONA 84 10/23/2020    BCR 14.4 10/23/2020    ANIONGAP 8.0 10/23/2020       Lab Results   Component Value Date    WBC 4.95 10/23/2020    HGB 15.3 10/23/2020    HCT 46.6 10/23/2020    MCV 89.3 10/23/2020     10/23/2020       Radiologic Studies         Assessment  Mr. Nova is a 45 y.o. male who presented with premature ejaculation.  He has not had any social or environmental changes.  He denies anxiety or depression.  He denies any ongoing marital discord.  He has experienced great improvement with SSRI, though now he sometimes has ejaculation that is delayed too long.  He has a history of vasectomy and vasectomy reversal.    He is status post bilateral hydrocelectomy and vasectomy, as well as right inguinal " herniorrhaphy.  He was discovered to have an incarcerated right inguinal hernia at the time of surgery, surgery was consulted and this was repaired.  He does have a left inguinal hernia that is reducible.  He plans to have this treated electively once he is fully recovered.    Plan  1.  Continue paroxetine to 10 mg QHS   2.  Semen analysis in 1 month -he can call to get results  3.  Follow-up in 6 months      Titus Mulligan MD

## 2020-12-02 NOTE — PROGRESS NOTES
"Subjective   Salvatore Nova III is a 45 y.o. male.   Chief Complaint   Patient presents with   • Follow-up     hernia     History of Present Illness   Mr. Nova comes the office today for his second follow-up visit after undergoing repair of an incarcerated right inguinal hernia on 10/27/2020.  He continues to improve.  He does still have occasional tenderness about 2 fingerbreadths above the surgical site but overall feels that he is doing well.  He does report that he is becoming increasingly symptomatic with regards to his left inguinal hernia, which was identified, but not repaired at the time of the initial procedure.  He is interested in proceeding with left inguinal hernia repair early in 2021.  Otherwise, he reports that he is doing well.  He denies fever, chills, nausea, vomiting, diarrhea, or problems with urination.    The following portions of the patient's history were reviewed and updated as appropriate: allergies, current medications, past family history, past medical history, past social history, past surgical history and problem list.      Objective    /70   Pulse 65   Temp 97.5 °F (36.4 °C)   Ht 175.3 cm (69\")   Wt 90.7 kg (200 lb)   SpO2 98%   BMI 29.53 kg/m²     Physical Exam  Constitutional:       Appearance: He is well-developed.   HENT:      Head: Normocephalic and atraumatic.   Cardiovascular:      Rate and Rhythm: Regular rhythm.   Pulmonary:      Effort: Pulmonary effort is normal.   Abdominal:      General: There is no distension.      Palpations: Abdomen is soft.      Tenderness: There is no abdominal tenderness.      Comments: Right groin incision well-healed.  Firm, palpable healing ridge below the incision, as expected.  No evidence of hernia recurrence.  No edema, erythema, or cellulitis.      Noted left inguinal hernia, reducible.   Skin:     General: Skin is warm and dry.   Neurological:      Mental Status: He is alert and oriented to person, place, and time. "   Psychiatric:         Behavior: Behavior normal.         Assessment/Plan   Diagnoses and all orders for this visit:    1. Reducible left inguinal hernia (Primary)  -     Case Request; Standing  -     lactated ringers infusion  -     heparin (porcine) 5000 UNIT/ML injection 5,000 Units  -     sodium chloride 0.9 % flush 3 mL  -     sodium chloride 0.9 % flush 10 mL  -     ceFAZolin (ANCEF) 2 g in sodium chloride 0.9 % 100 mL IVPB  -     Case Request    2. S/P inguinal hernia repair using synthetic patch    Other orders  -     Follow Anesthesia Guidelines / Standing Orders; Future  -     Provide NPO Instructions to Patient; Future  -     Chlorhexidine Skin Prep; Future  -     Follow Anesthesia Guidelines / Standing Orders; Standing  -     Verify NPO Status; Standing  -     SCD (Sequential Compression Device) - Place on Patient in Pre-Op; Standing  -     Verify / Perform Chlorhexidine Skin Prep; Standing  -     Verify / Perform Chlorhexidine Skin Prep if Indicated (If Not Already Completed); Standing  -     Insert Peripheral IV; Standing  -     Saline Lock & Maintain IV Access; Standing  -     Obtain Informed Consent      Overall, Mr. Nova is recovering well following repair of an incarcerated right inguinal hernia performed simultaneously with a separate urology procedure.  At this point, he may return to unrestricted physical activity, but I did advise him to proceed cautiously with activity advancement.  We had a detailed discussion regarding proceeding with repair of his left inguinal hernia which was not previously addressed.  He is interested in going forward.  He is aware of the risk, benefits, and alternatives to the proposed surgical procedure, and wishes to be scheduled today.  He is hoping to have this done in early March.  We will go ahead and tentatively schedule him, but he was informed that if anything were to change with his medical history, he would need to return the office for reevaluation prior  to proceeding with surgery.  Alternatively, should his schedule change, he may call us to be rescheduled.  Otherwise, I will see him on as-needed basis in the interim.  He knows to call this office with any questions or problems that should arise.

## 2020-12-03 ENCOUNTER — OFFICE VISIT (OUTPATIENT)
Dept: SURGERY | Facility: CLINIC | Age: 45
End: 2020-12-03

## 2020-12-03 VITALS
BODY MASS INDEX: 29.62 KG/M2 | HEIGHT: 69 IN | DIASTOLIC BLOOD PRESSURE: 70 MMHG | TEMPERATURE: 97.5 F | WEIGHT: 200 LBS | OXYGEN SATURATION: 98 % | SYSTOLIC BLOOD PRESSURE: 122 MMHG | HEART RATE: 65 BPM

## 2020-12-03 DIAGNOSIS — Z87.19 S/P INGUINAL HERNIA REPAIR USING SYNTHETIC PATCH: ICD-10-CM

## 2020-12-03 DIAGNOSIS — K40.90 REDUCIBLE LEFT INGUINAL HERNIA: Primary | ICD-10-CM

## 2020-12-03 DIAGNOSIS — Z98.890 S/P INGUINAL HERNIA REPAIR USING SYNTHETIC PATCH: ICD-10-CM

## 2020-12-03 PROBLEM — K40.30 INCARCERATED RIGHT INGUINAL HERNIA: Status: RESOLVED | Noted: 2020-10-27 | Resolved: 2020-12-03

## 2020-12-03 PROCEDURE — 99024 POSTOP FOLLOW-UP VISIT: CPT | Performed by: SURGERY

## 2020-12-03 RX ORDER — SODIUM CHLORIDE, SODIUM LACTATE, POTASSIUM CHLORIDE, CALCIUM CHLORIDE 600; 310; 30; 20 MG/100ML; MG/100ML; MG/100ML; MG/100ML
50 INJECTION, SOLUTION INTRAVENOUS CONTINUOUS
Status: CANCELLED | OUTPATIENT
Start: 2021-02-08

## 2020-12-03 RX ORDER — HEPARIN SODIUM 5000 [USP'U]/ML
5000 INJECTION, SOLUTION INTRAVENOUS; SUBCUTANEOUS ONCE
Status: CANCELLED | OUTPATIENT
Start: 2021-02-08

## 2020-12-03 RX ORDER — SODIUM CHLORIDE 0.9 % (FLUSH) 0.9 %
3 SYRINGE (ML) INJECTION EVERY 12 HOURS SCHEDULED
Status: CANCELLED | OUTPATIENT
Start: 2021-02-08

## 2020-12-03 RX ORDER — SODIUM CHLORIDE 0.9 % (FLUSH) 0.9 %
10 SYRINGE (ML) INJECTION AS NEEDED
Status: CANCELLED | OUTPATIENT
Start: 2021-02-08

## 2020-12-03 RX ORDER — CEFAZOLIN SODIUM 2 G/50ML
2 SOLUTION INTRAVENOUS ONCE
Status: CANCELLED | OUTPATIENT
Start: 2021-02-08 | End: 2020-12-03

## 2021-01-04 ENCOUNTER — TELEPHONE (OUTPATIENT)
Dept: INTERNAL MEDICINE | Facility: CLINIC | Age: 46
End: 2021-01-04

## 2021-01-14 RX ORDER — CETIRIZINE HYDROCHLORIDE 10 MG/1
10 TABLET ORAL DAILY
Qty: 30 TABLET | Refills: 0 | Status: SHIPPED | OUTPATIENT
Start: 2021-01-14

## 2021-01-22 ENCOUNTER — OFFICE VISIT (OUTPATIENT)
Dept: SURGERY | Facility: CLINIC | Age: 46
End: 2021-01-22

## 2021-01-22 VITALS
WEIGHT: 207.6 LBS | SYSTOLIC BLOOD PRESSURE: 148 MMHG | BODY MASS INDEX: 30.75 KG/M2 | OXYGEN SATURATION: 98 % | TEMPERATURE: 97.5 F | HEART RATE: 87 BPM | HEIGHT: 69 IN | DIASTOLIC BLOOD PRESSURE: 82 MMHG

## 2021-01-22 DIAGNOSIS — R10.31 SEVERE RIGHT GROIN PAIN: ICD-10-CM

## 2021-01-22 DIAGNOSIS — R10.30 INGUINAL PAIN, UNSPECIFIED LATERALITY: Primary | ICD-10-CM

## 2021-01-22 PROCEDURE — 99213 OFFICE O/P EST LOW 20 MIN: CPT | Performed by: SURGERY

## 2021-01-22 RX ORDER — METHYLPREDNISOLONE 4 MG/1
21 TABLET ORAL DAILY
Qty: 21 TABLET | Refills: 0 | Status: SHIPPED | OUTPATIENT
Start: 2021-01-22 | End: 2021-02-08 | Stop reason: HOSPADM

## 2021-01-22 RX ORDER — IBUPROFEN 800 MG/1
800 TABLET ORAL EVERY 8 HOURS SCHEDULED
Qty: 21 TABLET | Refills: 0 | Status: SHIPPED | OUTPATIENT
Start: 2021-01-22 | End: 2021-01-29

## 2021-01-25 RX ORDER — IBUPROFEN 800 MG/1
TABLET ORAL
Qty: 21 TABLET | Refills: 0 | OUTPATIENT
Start: 2021-01-25

## 2021-01-25 NOTE — TELEPHONE ENCOUNTER
Patient called and stated his pain had not improved since Friday and would like to move forward with the MRI. Please advise.

## 2021-01-26 DIAGNOSIS — F52.4 PREMATURE EJACULATION: ICD-10-CM

## 2021-01-26 NOTE — TELEPHONE ENCOUNTER
Caller: Salvatore Nova III    Relationship: Self    Best call back number:[phone]    Medication needed:   Requested Prescriptions     Pending Prescriptions Disp Refills   • cetirizine (zyrTEC) 10 MG tablet 30 tablet 0     Sig: Take 1 tablet by mouth Daily. MUST HAVE APPOINTMENT FOR FURTHER REFILLS   • omeprazole (priLOSEC) 40 MG capsule      • PARoxetine (PAXIL) 10 MG tablet 30 tablet 1     Sig: Take 1 tablet by mouth Every Night.   • famotidine (Pepcid) 40 MG tablet 90 tablet 3     Sig: Take 1 tablet by mouth Daily.   • fluticasone (FLONASE) 50 MCG/ACT nasal spray          When do you need the refill by: WITHIN 1 WEEK    What details did the patient provide when requesting the medication:     Does the patient have less than a 3 day supply:  [] Yes  [] No    What is the patient's preferred pharmacy:    [facility] [phone] FAX [phone]

## 2021-01-27 RX ORDER — FAMOTIDINE 40 MG/1
40 TABLET, FILM COATED ORAL DAILY
Qty: 90 TABLET | Refills: 3 | OUTPATIENT
Start: 2021-01-27

## 2021-01-27 RX ORDER — CETIRIZINE HYDROCHLORIDE 10 MG/1
10 TABLET ORAL DAILY
Qty: 30 TABLET | Refills: 0 | OUTPATIENT
Start: 2021-01-27

## 2021-01-27 RX ORDER — OMEPRAZOLE 40 MG/1
CAPSULE, DELAYED RELEASE ORAL
OUTPATIENT
Start: 2021-01-27

## 2021-01-27 RX ORDER — PAROXETINE 10 MG/1
10 TABLET, FILM COATED ORAL NIGHTLY
Qty: 30 TABLET | Refills: 1 | OUTPATIENT
Start: 2021-01-27

## 2021-01-27 RX ORDER — FLUTICASONE PROPIONATE 50 MCG
SPRAY, SUSPENSION (ML) NASAL
OUTPATIENT
Start: 2021-01-27

## 2021-01-28 ENCOUNTER — HOSPITAL ENCOUNTER (OUTPATIENT)
Dept: MRI IMAGING | Facility: HOSPITAL | Age: 46
Discharge: HOME | End: 2021-01-28
Admitting: SURGERY

## 2021-01-28 DIAGNOSIS — R10.31 SEVERE RIGHT GROIN PAIN: ICD-10-CM

## 2021-01-28 PROCEDURE — 73721 MRI JNT OF LWR EXTRE W/O DYE: CPT

## 2021-01-29 ENCOUNTER — TELEPHONE (OUTPATIENT)
Dept: SURGERY | Facility: CLINIC | Age: 46
End: 2021-01-29

## 2021-01-29 NOTE — TELEPHONE ENCOUNTER
----- Message from Hiwot Buckley MD sent at 1/29/2021 11:00 AM EST -----  You may inform the patient that the MRI was normal.

## 2021-01-29 NOTE — TELEPHONE ENCOUNTER
Patient was notified. Patient stated pain was still there and not improving and wanted advice on what he could do.

## 2021-02-04 ENCOUNTER — APPOINTMENT (OUTPATIENT)
Dept: PREADMISSION TESTING | Facility: HOSPITAL | Age: 46
End: 2021-02-04

## 2021-02-04 ENCOUNTER — TELEPHONE (OUTPATIENT)
Dept: SURGERY | Facility: CLINIC | Age: 46
End: 2021-02-04

## 2021-02-04 ENCOUNTER — APPOINTMENT (OUTPATIENT)
Dept: MRI IMAGING | Facility: HOSPITAL | Age: 46
End: 2021-02-04

## 2021-02-04 VITALS — HEIGHT: 69 IN | WEIGHT: 215 LBS | BODY MASS INDEX: 31.84 KG/M2

## 2021-02-04 LAB
ANION GAP SERPL CALCULATED.3IONS-SCNC: 6.5 MMOL/L (ref 5–15)
BUN SERPL-MCNC: 11 MG/DL (ref 6–20)
BUN/CREAT SERPL: 10.8 (ref 7–25)
CALCIUM SPEC-SCNC: 9 MG/DL (ref 8.6–10.5)
CHLORIDE SERPL-SCNC: 100 MMOL/L (ref 98–107)
CO2 SERPL-SCNC: 30.5 MMOL/L (ref 22–29)
CREAT SERPL-MCNC: 1.02 MG/DL (ref 0.76–1.27)
DEPRECATED RDW RBC AUTO: 43.5 FL (ref 37–54)
ERYTHROCYTE [DISTWIDTH] IN BLOOD BY AUTOMATED COUNT: 13.3 % (ref 12.3–15.4)
GFR SERPL CREATININE-BSD FRML MDRD: 79 ML/MIN/1.73
GLUCOSE SERPL-MCNC: 97 MG/DL (ref 65–99)
HCT VFR BLD AUTO: 43.4 % (ref 37.5–51)
HGB BLD-MCNC: 14.6 G/DL (ref 13–17.7)
MCH RBC QN AUTO: 30 PG (ref 26.6–33)
MCHC RBC AUTO-ENTMCNC: 33.6 G/DL (ref 31.5–35.7)
MCV RBC AUTO: 89.3 FL (ref 79–97)
PLATELET # BLD AUTO: 216 10*3/MM3 (ref 140–450)
PMV BLD AUTO: 10.9 FL (ref 6–12)
POTASSIUM SERPL-SCNC: 4.2 MMOL/L (ref 3.5–5.2)
RBC # BLD AUTO: 4.86 10*6/MM3 (ref 4.14–5.8)
SODIUM SERPL-SCNC: 137 MMOL/L (ref 136–145)
WBC # BLD AUTO: 6.27 10*3/MM3 (ref 3.4–10.8)

## 2021-02-04 PROCEDURE — 85027 COMPLETE CBC AUTOMATED: CPT

## 2021-02-04 PROCEDURE — 36415 COLL VENOUS BLD VENIPUNCTURE: CPT

## 2021-02-04 PROCEDURE — 80048 BASIC METABOLIC PNL TOTAL CA: CPT

## 2021-02-04 PROCEDURE — 93005 ELECTROCARDIOGRAM TRACING: CPT

## 2021-02-04 PROCEDURE — U0004 COV-19 TEST NON-CDC HGH THRU: HCPCS

## 2021-02-04 PROCEDURE — C9803 HOPD COVID-19 SPEC COLLECT: HCPCS

## 2021-02-05 LAB
QT INTERVAL: 402 MS
QTC INTERVAL: 381 MS
SARS-COV-2 RNA RESP QL NAA+PROBE: NOT DETECTED

## 2021-02-08 ENCOUNTER — ANESTHESIA (OUTPATIENT)
Dept: PERIOP | Facility: HOSPITAL | Age: 46
End: 2021-02-08

## 2021-02-08 ENCOUNTER — ANESTHESIA EVENT (OUTPATIENT)
Dept: PERIOP | Facility: HOSPITAL | Age: 46
End: 2021-02-08

## 2021-02-08 ENCOUNTER — HOSPITAL ENCOUNTER (OUTPATIENT)
Facility: HOSPITAL | Age: 46
Setting detail: HOSPITAL OUTPATIENT SURGERY
Discharge: HOME | End: 2021-02-08
Attending: SURGERY | Admitting: SURGERY

## 2021-02-08 VITALS
HEART RATE: 60 BPM | DIASTOLIC BLOOD PRESSURE: 92 MMHG | RESPIRATION RATE: 16 BRPM | SYSTOLIC BLOOD PRESSURE: 131 MMHG | OXYGEN SATURATION: 98 % | TEMPERATURE: 97.6 F

## 2021-02-08 DIAGNOSIS — F52.4 PREMATURE EJACULATION: ICD-10-CM

## 2021-02-08 DIAGNOSIS — K40.90 REDUCIBLE LEFT INGUINAL HERNIA: ICD-10-CM

## 2021-02-08 PROCEDURE — 25010000003 CEFAZOLIN SODIUM-DEXTROSE 2-3 GM-%(50ML) RECONSTITUTED SOLUTION: Performed by: SURGERY

## 2021-02-08 PROCEDURE — 25010000002 ONDANSETRON PER 1 MG: Performed by: NURSE ANESTHETIST, CERTIFIED REGISTERED

## 2021-02-08 PROCEDURE — 25010000002 HEPARIN (PORCINE) PER 1000 UNITS: Performed by: SURGERY

## 2021-02-08 PROCEDURE — C1781 MESH (IMPLANTABLE): HCPCS | Performed by: SURGERY

## 2021-02-08 PROCEDURE — 25010000002 DEXAMETHASONE PER 1 MG: Performed by: NURSE ANESTHETIST, CERTIFIED REGISTERED

## 2021-02-08 PROCEDURE — 94799 UNLISTED PULMONARY SVC/PX: CPT

## 2021-02-08 PROCEDURE — S0260 H&P FOR SURGERY: HCPCS | Performed by: SURGERY

## 2021-02-08 PROCEDURE — 25010000002 HYDROMORPHONE PER 4 MG: Performed by: NURSE ANESTHETIST, CERTIFIED REGISTERED

## 2021-02-08 PROCEDURE — 25010000002 KETOROLAC TROMETHAMINE PER 15 MG: Performed by: NURSE ANESTHETIST, CERTIFIED REGISTERED

## 2021-02-08 PROCEDURE — 25010000003 LIDOCAINE 1 % SOLUTION: Performed by: SURGERY

## 2021-02-08 PROCEDURE — 49505 PRP I/HERN INIT REDUC >5 YR: CPT | Performed by: SURGERY

## 2021-02-08 PROCEDURE — 25010000002 PROPOFOL 10 MG/ML EMULSION: Performed by: NURSE ANESTHETIST, CERTIFIED REGISTERED

## 2021-02-08 DEVICE — MESH PROLN 3X6: Type: IMPLANTABLE DEVICE | Site: GROIN | Status: FUNCTIONAL

## 2021-02-08 RX ORDER — LIDOCAINE HYDROCHLORIDE 10 MG/ML
INJECTION, SOLUTION INFILTRATION; PERINEURAL AS NEEDED
Status: DISCONTINUED | OUTPATIENT
Start: 2021-02-08 | End: 2021-02-08 | Stop reason: HOSPADM

## 2021-02-08 RX ORDER — HYDROCODONE BITARTRATE AND ACETAMINOPHEN 7.5; 325 MG/1; MG/1
1 TABLET ORAL ONCE AS NEEDED
Status: CANCELLED | OUTPATIENT
Start: 2021-02-08 | End: 2021-02-15

## 2021-02-08 RX ORDER — ONDANSETRON 2 MG/ML
INJECTION INTRAMUSCULAR; INTRAVENOUS AS NEEDED
Status: DISCONTINUED | OUTPATIENT
Start: 2021-02-08 | End: 2021-02-08 | Stop reason: SURG

## 2021-02-08 RX ORDER — DEXAMETHASONE SODIUM PHOSPHATE 4 MG/ML
INJECTION, SOLUTION INTRA-ARTICULAR; INTRALESIONAL; INTRAMUSCULAR; INTRAVENOUS; SOFT TISSUE AS NEEDED
Status: DISCONTINUED | OUTPATIENT
Start: 2021-02-08 | End: 2021-02-08 | Stop reason: SURG

## 2021-02-08 RX ORDER — ONDANSETRON 2 MG/ML
4 INJECTION INTRAMUSCULAR; INTRAVENOUS ONCE AS NEEDED
Status: CANCELLED | OUTPATIENT
Start: 2021-02-08

## 2021-02-08 RX ORDER — PROPOFOL 10 MG/ML
VIAL (ML) INTRAVENOUS AS NEEDED
Status: DISCONTINUED | OUTPATIENT
Start: 2021-02-08 | End: 2021-02-08 | Stop reason: SURG

## 2021-02-08 RX ORDER — SODIUM CHLORIDE 0.9 % (FLUSH) 0.9 %
3 SYRINGE (ML) INJECTION EVERY 12 HOURS SCHEDULED
Status: DISCONTINUED | OUTPATIENT
Start: 2021-02-08 | End: 2021-02-08 | Stop reason: HOSPADM

## 2021-02-08 RX ORDER — CEFAZOLIN SODIUM 2 G/50ML
2 SOLUTION INTRAVENOUS ONCE
Status: COMPLETED | OUTPATIENT
Start: 2021-02-08 | End: 2021-02-08

## 2021-02-08 RX ORDER — SODIUM CHLORIDE, SODIUM LACTATE, POTASSIUM CHLORIDE, CALCIUM CHLORIDE 600; 310; 30; 20 MG/100ML; MG/100ML; MG/100ML; MG/100ML
50 INJECTION, SOLUTION INTRAVENOUS CONTINUOUS
Status: DISCONTINUED | OUTPATIENT
Start: 2021-02-08 | End: 2021-02-08 | Stop reason: HOSPADM

## 2021-02-08 RX ORDER — HYDROMORPHONE HCL 110MG/55ML
PATIENT CONTROLLED ANALGESIA SYRINGE INTRAVENOUS AS NEEDED
Status: DISCONTINUED | OUTPATIENT
Start: 2021-02-08 | End: 2021-02-08 | Stop reason: SURG

## 2021-02-08 RX ORDER — KETOROLAC TROMETHAMINE 30 MG/ML
INJECTION, SOLUTION INTRAMUSCULAR; INTRAVENOUS AS NEEDED
Status: DISCONTINUED | OUTPATIENT
Start: 2021-02-08 | End: 2021-02-08 | Stop reason: SURG

## 2021-02-08 RX ORDER — LIDOCAINE HYDROCHLORIDE 20 MG/ML
INJECTION, SOLUTION INTRAVENOUS AS NEEDED
Status: DISCONTINUED | OUTPATIENT
Start: 2021-02-08 | End: 2021-02-08 | Stop reason: SURG

## 2021-02-08 RX ORDER — BUPIVACAINE HYDROCHLORIDE 2.5 MG/ML
INJECTION, SOLUTION EPIDURAL; INFILTRATION; INTRACAUDAL AS NEEDED
Status: DISCONTINUED | OUTPATIENT
Start: 2021-02-08 | End: 2021-02-08 | Stop reason: HOSPADM

## 2021-02-08 RX ORDER — HEPARIN SODIUM 5000 [USP'U]/ML
5000 INJECTION, SOLUTION INTRAVENOUS; SUBCUTANEOUS ONCE
Status: COMPLETED | OUTPATIENT
Start: 2021-02-08 | End: 2021-02-08

## 2021-02-08 RX ORDER — HYDROCODONE BITARTRATE AND ACETAMINOPHEN 7.5; 325 MG/1; MG/1
1 TABLET ORAL EVERY 4 HOURS PRN
Qty: 20 TABLET | Refills: 0 | Status: SHIPPED | OUTPATIENT
Start: 2021-02-08 | End: 2021-03-23

## 2021-02-08 RX ORDER — SODIUM CHLORIDE 0.9 % (FLUSH) 0.9 %
10 SYRINGE (ML) INJECTION AS NEEDED
Status: DISCONTINUED | OUTPATIENT
Start: 2021-02-08 | End: 2021-02-08 | Stop reason: HOSPADM

## 2021-02-08 RX ADMIN — ONDANSETRON 4 MG: 2 INJECTION INTRAMUSCULAR; INTRAVENOUS at 12:30

## 2021-02-08 RX ADMIN — KETOROLAC TROMETHAMINE 15 MG: 30 INJECTION, SOLUTION INTRAMUSCULAR at 13:58

## 2021-02-08 RX ADMIN — SODIUM CHLORIDE, POTASSIUM CHLORIDE, SODIUM LACTATE AND CALCIUM CHLORIDE: 600; 310; 30; 20 INJECTION, SOLUTION INTRAVENOUS at 12:30

## 2021-02-08 RX ADMIN — HYDROMORPHONE HYDROCHLORIDE 0.5 MG: 2 INJECTION, SOLUTION INTRAMUSCULAR; INTRAVENOUS; SUBCUTANEOUS at 12:30

## 2021-02-08 RX ADMIN — SODIUM CHLORIDE, POTASSIUM CHLORIDE, SODIUM LACTATE AND CALCIUM CHLORIDE: 600; 310; 30; 20 INJECTION, SOLUTION INTRAVENOUS at 13:39

## 2021-02-08 RX ADMIN — PROPOFOL 180 MCG/KG/MIN: 10 INJECTION, EMULSION INTRAVENOUS at 12:30

## 2021-02-08 RX ADMIN — LIDOCAINE HYDROCHLORIDE 60 MG: 20 INJECTION, SOLUTION INTRAVENOUS at 12:30

## 2021-02-08 RX ADMIN — PROPOFOL 100 MG: 10 INJECTION, EMULSION INTRAVENOUS at 12:30

## 2021-02-08 RX ADMIN — DEXAMETHASONE SODIUM PHOSPHATE 4 MG: 4 INJECTION, SOLUTION INTRAMUSCULAR; INTRAVENOUS at 12:30

## 2021-02-08 RX ADMIN — CEFAZOLIN SODIUM 2 G: 2 SOLUTION INTRAVENOUS at 12:30

## 2021-02-08 RX ADMIN — HYDROMORPHONE HYDROCHLORIDE 0.5 MG: 2 INJECTION, SOLUTION INTRAMUSCULAR; INTRAVENOUS; SUBCUTANEOUS at 12:39

## 2021-02-08 RX ADMIN — HEPARIN SODIUM 5000 UNITS: 5000 INJECTION INTRAVENOUS; SUBCUTANEOUS at 12:20

## 2021-02-08 NOTE — ANESTHESIA POSTPROCEDURE EVALUATION
Patient: Salvatore Nova III    Procedure Summary     Date: 02/08/21 Room / Location: [facility] OR  /  [facility]    Anesthesia Start: 1230 Anesthesia Stop: 1410    Procedure: INGUINAL HERNIA REPAIR WITH MESH (Left Abdomen) Diagnosis:       Reducible left inguinal hernia      (Reducible left inguinal hernia [K40.90])    Surgeon: Hiwot Buckley MD Provider: Kenya Tavera CRNA    Anesthesia Type: general ASA Status: 2          Anesthesia Type: general    Vitals  Vitals Value Taken Time   /90 02/08/21 1410   Temp 97.6 °F (36.4 °C) 02/08/21 1410   Pulse 78 02/08/21 1410   Resp 16 02/08/21 1410   SpO2 94 % 02/08/21 1410           Post Anesthesia Care and Evaluation    Patient location during evaluation: PHASE II  Patient participation: complete - patient participated  Level of consciousness: awake and alert  Pain score: 0  Pain management: satisfactory to patient  Airway patency: patent  Anesthetic complications: No anesthetic complications  PONV Status: none  Cardiovascular status: acceptable and stable  Respiratory status: acceptable  Hydration status: acceptable

## 2021-02-08 NOTE — ANESTHESIA PREPROCEDURE EVALUATION
Anesthesia Evaluation     Patient summary reviewed and Nursing notes reviewed   history of anesthetic complications: prolonged sedation  NPO Solid Status: > 8 hours  NPO Liquid Status: > 8 hours           Airway   Mallampati: II  TM distance: >3 FB  Neck ROM: full  No difficulty expected  Dental - normal exam     Pulmonary - normal exam   (+) sleep apnea,   (-) not a smoker  Cardiovascular - normal exam    ECG reviewed    Dysrhythmias:  wpw       Neuro/Psych  GI/Hepatic/Renal/Endo    (+) obesity,  GERD well controlled,      Musculoskeletal     (+) arthralgias, myalgias,   Abdominal  - normal exam   Substance History      OB/GYN          Other   arthritis,      ROS/Med Hx Other: Labs reviewed  ekg sr                      Anesthesia Plan    ASA 2     general   (Risks and benefits discussed including risk of aspiration, recall and dental damage. All patient questions answered.    Will continue with plan of care.)  intravenous induction     Anesthetic plan, all risks, benefits, and alternatives have been provided, discussed and informed consent has been obtained with: patient.    Plan discussed with CRNA.

## 2021-02-09 RX ORDER — PAROXETINE 10 MG/1
TABLET, FILM COATED ORAL
Qty: 30 TABLET | Refills: 0 | Status: SHIPPED | OUTPATIENT
Start: 2021-02-09 | End: 2021-03-23

## 2021-02-11 ENCOUNTER — HOSPITAL ENCOUNTER (OUTPATIENT)
Dept: MRI IMAGING | Facility: HOSPITAL | Age: 46
End: 2021-02-11

## 2021-02-18 ENCOUNTER — TELEPHONE (OUTPATIENT)
Dept: SURGERY | Facility: CLINIC | Age: 46
End: 2021-02-18

## 2021-02-18 NOTE — TELEPHONE ENCOUNTER
PT WAS EXPOSED TO COVID 19 IS IN QUARANTINE. I HAVE R/S PT TO 03/09/2021 HE IS A PO HERNIA REPAIR.PLEASE ADVISE PT IF YOU HAVE ANY CONCERNS [phone].

## 2021-02-19 RX ORDER — AZITHROMYCIN 250 MG/1
TABLET, FILM COATED ORAL
Qty: 6 TABLET | Refills: 0 | Status: SHIPPED | OUTPATIENT
Start: 2021-02-19 | End: 2021-03-23

## 2021-03-09 ENCOUNTER — TELEPHONE (OUTPATIENT)
Dept: SURGERY | Facility: CLINIC | Age: 46
End: 2021-03-09

## 2021-03-18 DIAGNOSIS — F52.4 PREMATURE EJACULATION: ICD-10-CM

## 2021-03-23 ENCOUNTER — OFFICE VISIT (OUTPATIENT)
Dept: SURGERY | Facility: CLINIC | Age: 46
End: 2021-03-23

## 2021-03-23 VITALS
OXYGEN SATURATION: 98 % | TEMPERATURE: 97.3 F | WEIGHT: 212 LBS | BODY MASS INDEX: 31.4 KG/M2 | DIASTOLIC BLOOD PRESSURE: 78 MMHG | SYSTOLIC BLOOD PRESSURE: 126 MMHG | HEIGHT: 69 IN | HEART RATE: 61 BPM

## 2021-03-23 DIAGNOSIS — Z87.19 S/P INGUINAL HERNIA REPAIR USING SYNTHETIC PATCH: Primary | ICD-10-CM

## 2021-03-23 DIAGNOSIS — Z98.890 S/P INGUINAL HERNIA REPAIR USING SYNTHETIC PATCH: Primary | ICD-10-CM

## 2021-03-23 PROCEDURE — 99024 POSTOP FOLLOW-UP VISIT: CPT | Performed by: SURGERY

## 2021-03-23 RX ORDER — PAROXETINE 10 MG/1
TABLET, FILM COATED ORAL
Qty: 30 TABLET | Refills: 0 | Status: SHIPPED | OUTPATIENT
Start: 2021-03-23 | End: 2021-04-19

## 2021-04-18 DIAGNOSIS — F52.4 PREMATURE EJACULATION: ICD-10-CM

## 2021-04-19 RX ORDER — PAROXETINE 10 MG/1
TABLET, FILM COATED ORAL
Qty: 30 TABLET | Refills: 0 | Status: SHIPPED | OUTPATIENT
Start: 2021-04-19

## 2021-04-27 ENCOUNTER — LAB REQUISITION (OUTPATIENT)
Dept: LAB | Facility: HOSPITAL | Age: 46
End: 2021-04-27

## 2021-04-27 ENCOUNTER — TELEPHONE (OUTPATIENT)
Dept: UROLOGY | Facility: CLINIC | Age: 46
End: 2021-04-27

## 2021-04-27 DIAGNOSIS — F52.4 PREMATURE EJACULATION: ICD-10-CM

## 2021-04-27 DIAGNOSIS — F52.4 PREMATURE EJACULATION: Primary | ICD-10-CM

## 2021-04-27 LAB — SPERM - POST VASECTOMY: NORMAL

## 2021-04-27 PROCEDURE — 89321 SEMEN ANAL SPERM DETECTION: CPT | Performed by: UROLOGY

## 2021-09-27 ENCOUNTER — TRANSCRIBE ORDERS (OUTPATIENT)
Dept: LAB | Facility: HOSPITAL | Age: 46
End: 2021-09-27

## 2021-09-27 ENCOUNTER — LAB (OUTPATIENT)
Dept: LAB | Facility: HOSPITAL | Age: 46
End: 2021-09-27

## 2021-09-27 DIAGNOSIS — Z20.822 COVID-19 RULED OUT: ICD-10-CM

## 2021-09-27 DIAGNOSIS — Z20.822 COVID-19 RULED OUT: Primary | ICD-10-CM

## 2021-09-27 PROCEDURE — U0004 COV-19 TEST NON-CDC HGH THRU: HCPCS

## 2021-09-28 LAB — SARS-COV-2 RNA NOSE QL NAA+PROBE: NOT DETECTED

## 2022-01-20 ENCOUNTER — LAB (OUTPATIENT)
Dept: LAB | Facility: HOSPITAL | Age: 47
End: 2022-01-20

## 2022-01-20 DIAGNOSIS — Z03.818 ENCOUNTER FOR PATIENT CONCERN ABOUT EXPOSURE TO INFECTIOUS ORGANISM: Primary | ICD-10-CM

## 2022-01-20 LAB — SARS-COV-2 RNA NOSE QL NAA+PROBE: DETECTED

## 2022-01-20 PROCEDURE — U0004 COV-19 TEST NON-CDC HGH THRU: HCPCS

## 2022-05-09 ENCOUNTER — TELEPHONE (OUTPATIENT)
Dept: URGENT CARE | Facility: CLINIC | Age: 47
End: 2022-05-09

## 2022-05-09 DIAGNOSIS — J30.9 ALLERGIC RHINITIS, UNSPECIFIED SEASONALITY, UNSPECIFIED TRIGGER: ICD-10-CM

## 2022-05-09 RX ORDER — FLUTICASONE PROPIONATE 50 MCG
SPRAY, SUSPENSION (ML) NASAL
Qty: 16 G | Refills: 0 | Status: SHIPPED | OUTPATIENT
Start: 2022-05-09

## 2023-05-24 NOTE — PROGRESS NOTES
Chief Complaint  Premature ejaculation    HPI  Mr. Nova is a 45 y.o. male with history of vasectomy and reversal in 2007 who presents for follow-up of PE.  At last visit we adjusted his paroxetine.    He says he is happy with the intra-ejaculatory latency time at present.  He and his wife present to discuss hydrocelectomy and vasectomy.  He has had a vasectomy and reversal in the past.  He is also bothered by the small fluid collections around both testicles.  Scrotal ultrasound today confirms bilateral hydroceles, though small.  There is no hernia..    To review,  + Hx of WPW; no past EP or ablation procedures; saw a cardiologist many years ago  Has an EKG yearly  He says erections are 10/10.   Partner is his wife,  11 years  He says PE has been going on for 1 year  He denies any changes in his marriage or social changes; no new partners  Denies anxiety or depression.  He is only other medical problem is GERD.     Past Medical History  Past Medical History:   Diagnosis Date   • GERD (gastroesophageal reflux disease)    • Seasonal allergies        Past Surgical History  Past Surgical History:   Procedure Laterality Date   • DENTAL PROCEDURE     • HERNIA REPAIR     • SINUS SURGERY     • TONSILLECTOMY     • VARICOCELE EXCISION     • VASECTOMY REVERSAL         Medications    Current Outpatient Medications:   •  brompheniramine-pseudoephedrine-DM 30-2-10 MG/5ML syrup, Take 10 mL by mouth 4 (Four) Times a Day As Needed for Congestion or Cough., Disp: 200 mL, Rfl: 0  •  cetirizine (zyrTEC) 10 MG tablet, Take 1 tablet by mouth Daily., Disp: 90 tablet, Rfl: 3  •  diclofenac (VOLTAREN) 1 % gel gel, Apply 4 g topically 4 (Four) Times a Day As Needed (joint pain)., Disp: 100 g, Rfl: 5  •  famotidine (Pepcid) 40 MG tablet, Take 1 tablet by mouth Daily., Disp: 90 tablet, Rfl: 3  •  fluticasone (FLONASE) 50 MCG/ACT nasal spray, , Disp: , Rfl:   •  PARoxetine (Paxil) 10 MG tablet, Take 1 tablet by mouth Every Night.,  "Disp: 30 tablet, Rfl: 2    Allergies  No Known Allergies    Social History  Social History     Socioeconomic History   • Marital status:      Spouse name: Not on file   • Number of children: Not on file   • Years of education: Not on file   • Highest education level: Not on file   Tobacco Use   • Smoking status: Never Smoker   • Smokeless tobacco: Never Used   Substance and Sexual Activity   • Alcohol use: No   • Drug use: No   • Sexual activity: Defer       Family History  He has + family history of prostate cancer in grandfather      Review of Systems  Constitutional: No fevers or chills  Skin: Negative for rash  Endocrine: No heat/cold intolerance   Cardiovascular: Negative for chest pain or dyspnea on exertion  Respiratory: Negative for shortness of breath or wheezing  Gastrointestinal: No constipation, nausea or vomiting  Genitourinary: Negative for new lower urinary tract symptoms, current gross hematuria or dysuria.  Musculoskeletal: No flank pain  Neurological:  Negative for frequent headaches or dizziness  Lymph/Heme: Negative for leg swelling or calf pain.      Physical Exam  Visit Vitals  /82   Pulse 64   Temp 97.9 °F (36.6 °C)   Ht 175.3 cm (69\")   Wt 85.3 kg (188 lb)   SpO2 98%   BMI 27.76 kg/m²     Constitutional: NAD, WDWN.   HEENT: NCAT. Conjunctivae normal.  MMM.    Cardiovascular: Regular rate.  Pulmonary/Chest: Respirations are even and non-labored bilaterally.  Abdominal: Soft. No distension, tenderness, masses or guarding. No CVA tenderness.  Neurological: A + O x 3.  Cranial Nerves II-XII grossly intact. Normal gait.  Extremities: KEREN x 4, Warm. No clubbing.  No cyanosis.    Skin: Pink, warm and dry.  No rashes noted.  Psychiatric:  Normal mood and affect    Genitourinary bilateral small to medium sized hydroceles.  No hernias.    Labs  No results found for: PSA    Lab Results   Component Value Date    GLUCOSE 91 03/13/2017    CALCIUM 9.7 04/23/2018     04/23/2018    K 4.5 " 04/23/2018    CO2 29.0 04/23/2018     04/23/2018    BUN 12 04/23/2018    CREATININE 0.80 04/23/2018    EGFRIFAFRI 129 04/23/2018    EGFRIFNONA 107 04/23/2018    BCR 15.0 04/23/2018    ANIONGAP 11.7 03/13/2017       Lab Results   Component Value Date    WBC 9.77 03/13/2017    HGB 14.5 03/13/2017    HCT 43.0 03/13/2017    MCV 88.8 03/13/2017     03/13/2017       Radiologic Studies         Assessment  Mr. Nova is a 45 y.o. male who presents with premature ejaculation.  He has not had any social or environmental changes.  He denies anxiety or depression.  He denies any ongoing marital discord.  He has experienced great improvement with SSRI, though now he sometimes has ejaculation that is delayed too long.  He also complains of new scrotal swelling and has likely bilateral hydroceles.  He has a history of vasectomy and vasectomy reversal.    Plan  1.  Continue paroxetine to 10 mg QHS   2.  Schedule bilateral hydrocelectomy bilateral and vasectomy; 10/27/2020      Titus Mulligan MD   yes

## 2025-04-10 ENCOUNTER — APPOINTMENT (OUTPATIENT)
Dept: GENERAL RADIOLOGY | Facility: HOSPITAL | Age: 50
End: 2025-04-10
Payer: OTHER GOVERNMENT

## 2025-04-10 ENCOUNTER — HOSPITAL ENCOUNTER (EMERGENCY)
Facility: HOSPITAL | Age: 50
Discharge: HOME | End: 2025-04-10
Attending: STUDENT IN AN ORGANIZED HEALTH CARE EDUCATION/TRAINING PROGRAM
Payer: OTHER GOVERNMENT

## 2025-04-10 VITALS
SYSTOLIC BLOOD PRESSURE: 139 MMHG | DIASTOLIC BLOOD PRESSURE: 95 MMHG | RESPIRATION RATE: 18 BRPM | TEMPERATURE: 98.1 F | WEIGHT: 214 LBS | HEIGHT: 69 IN | BODY MASS INDEX: 31.7 KG/M2 | OXYGEN SATURATION: 96 % | HEART RATE: 78 BPM

## 2025-04-10 DIAGNOSIS — S30.0XXA SACRAL CONTUSION, INITIAL ENCOUNTER: Primary | ICD-10-CM

## 2025-04-10 DIAGNOSIS — M25.571 ACUTE RIGHT ANKLE PAIN: ICD-10-CM

## 2025-04-10 PROCEDURE — 72220 X-RAY EXAM SACRUM TAILBONE: CPT

## 2025-04-10 PROCEDURE — 73610 X-RAY EXAM OF ANKLE: CPT

## 2025-04-10 PROCEDURE — 99283 EMERGENCY DEPT VISIT LOW MDM: CPT | Performed by: STUDENT IN AN ORGANIZED HEALTH CARE EDUCATION/TRAINING PROGRAM

## 2025-04-10 NOTE — ED PROVIDER NOTES
Pt Name: Salvatore Nova III  MRN: [id number]  : 1975  Date of Encounter: 4/10/2025    PCP: Farhad Horner MD      Subjective    History of Present Illness:    Chief Complaint: Tailbone pain, right ankle pain    History of Present Illness: Salvatore Nova III is a 50 y.o. male who presents to the ER complaining of tailbone pain, right ankle that started after having a ground-level fall that happened approximately 3 to 4 hours prior to arriving to the emergency room patient states he stepped awkwardly on to some stone causing him to twist his ankle and fall on his backside.  Patient states he was able to ambulate post fall but has had right ankle swelling and pain in his tailbone that is worsened over the interval..  Pain is described as Throbbing, Constant, and does not radiate  Patient rates pain as a 7 on a ten scale.    Triage Vitals:    ED Triage Vitals [04/10/25 1603]   Temp Heart Rate Resp BP SpO2   98.1 °F (36.7 °C) 78 18 139/95 96 %      Temp src Heart Rate Source Patient Position BP Location FiO2 (%)   Oral Monitor Sitting Left arm --       Nurses Notes reviewed and agree, including vitals, allergies, social history and prior medical history.     Patient has no known allergies.    Past Medical History:   Diagnosis Date    GERD (gastroesophageal reflux disease)     Gout     Incarcerated right inguinal hernia 10/27/2020    Left inguinal hernia     Seasonal allergies     Sleep apnea     CPAP compliant    Misti-Parkinson-White (WPW) pattern seen on electrocardiography        Past Surgical History:   Procedure Laterality Date    COLONOSCOPY      DENTAL PROCEDURE      ENDOSCOPY      HERNIA REPAIR  2019    umbilical    HYDROCELECTOMY Bilateral 10/27/2020    Procedure: HYDROCELECTOMY BILATERAL;  Surgeon: Titus Mulligan MD;  Location: [facility];  Service: Urology;  Laterality: Bilateral;    INGUINAL HERNIA REPAIR Right 10/27/2020    Procedure: INGUINAL HERNIA REPAIR;  Surgeon: Titus Mulligan MD;   Location: [facility];  Service: Urology;  Laterality: Right;    INGUINAL HERNIA REPAIR Left 2/8/2021    Procedure: INGUINAL HERNIA REPAIR WITH MESH;  Surgeon: Hiwot Buckley MD;  Location: [facility];  Service: General;  Laterality: Left;    KNEE ARTHROSCOPY Bilateral     SHOULDER SURGERY Right     SLAP    SINUS SURGERY      SKIN BIOPSY      benign    TONSILLECTOMY      VARICOCELE EXCISION      VASECTOMY  10/27/2020    VASECTOMY Bilateral 10/27/2020    Procedure: VASECTOMY BILATERAL;  Surgeon: Titus Mulligan MD;  Location: [facility];  Service: Urology;  Laterality: Bilateral;    VASECTOMY REVERSAL      VEIN SURGERY Bilateral     vericose veins       Social History     Socioeconomic History    Marital status:    Tobacco Use    Smoking status: Never    Smokeless tobacco: Never   Vaping Use    Vaping status: Never Used   Substance and Sexual Activity    Alcohol use: No    Drug use: No    Sexual activity: Defer       Family History   Problem Relation Age of Onset    No Known Problems Mother     No Known Problems Father     No Known Problems Sister     No Known Problems Brother        REVIEW OF SYSTEMS:     All systems reviewed and not pertinent unless noted.    Review of Systems   Musculoskeletal:  Positive for back pain and myalgias.   All other systems reviewed and are negative.      Objective    Physical Exam  Vitals and nursing note reviewed.   Constitutional:       Appearance: Normal appearance.   HENT:      Head: Normocephalic and atraumatic.   Eyes:      Extraocular Movements: Extraocular movements intact.      Pupils: Pupils are equal, round, and reactive to light.   Cardiovascular:      Rate and Rhythm: Normal rate and regular rhythm.      Pulses: Normal pulses.      Heart sounds: Normal heart sounds.   Pulmonary:      Effort: Pulmonary effort is normal.      Breath sounds: Normal breath sounds.   Abdominal:      General: Bowel sounds are normal.      Palpations: Abdomen is soft.    Musculoskeletal:         General: Normal range of motion.      Cervical back: Normal range of motion and neck supple.      Right ankle: Swelling present. Tenderness present.      Comments: Mild tenderness palpation over sacrum   Skin:     General: Skin is warm and dry.      Capillary Refill: Capillary refill takes less than 2 seconds.   Neurological:      Mental Status: He is alert.      GCS: GCS eye subscore is 4. GCS verbal subscore is 5. GCS motor subscore is 6.      Sensory: Sensation is intact.      Motor: Motor function is intact.   Psychiatric:         Attention and Perception: Attention and perception normal.         Mood and Affect: Mood and affect normal.         Speech: Speech normal.                           Procedures    ED Course:    No orders to display            Orders placed during this visit:    Orders Placed This Encounter   Procedures    XR Sacrum & Coccyx    XR Ankle 3+ View Right       LAB Results:    Lab Results (last 24 hours)       ** No results found for the last 24 hours. **             If labs were ordered, I have independently reviewed the results and considered them in the diagnosis and treatment plan for the patient    RADIOLOGY    XR Ankle 3+ View Right  Result Date: 4/10/2025   PROCEDURE: XR ANKLE 3+ VW RIGHT-  HISTORY: Fall with ankle pain  COMPARISON: None.  FINDINGS:  A three view exam demonstrates no acute fracture or dislocation. The joint spaces appear unremarkable. No soft tissue abnormality is seen.       Impression: No acute bony abnormality.        This report was signed and finalized on 4/10/2025 5:01 PM by Cordelia Miller MD.      XR Sacrum & Coccyx  Result Date: 4/10/2025  SACRUM/COCCYX  HISTORY: Low back pain fall with buttock pain.  COMPARISON: None.  FINDINGS: There is no acute fracture or dislocation. The sacral arches are intact. The SI joints appear intact. There is no acute soft tissue abnormality identified.      Impression: No acute osseous abnormality.    This  report was signed and finalized on 4/10/2025 5:00 PM by Cordelia Miller MD.         If I have ordered, I have independently reviewed the above noted radiographic studies.  Please see the radiologist dictation for the official interpretation    Medications given to patient in the ER    Medications - No data to display    AS OF 17:39 EDT VITALS:    BP - 139/95  HR - 78  TEMP - 98.1 °F (36.7 °C) (Oral)  O2 SATS - 96%         Shared Decision Making: After my consideration of the clinical presentation and laboratory/radiology studies obtained, I have discussed the findings with the patient/patient representative who is in agreement with the treatment plan and final disposition. Risks and benefits of discharge and/or observation admission were discussed.  Final disposition of the patient will be discharged home.  Patient is requested to follow-up with primary care provider and specialist in 1 week following final discharge.      Medical Decision Making   Salvatore Nova III is a 50 y.o. male who presents to the ER complaining of tailbone pain, right ankle that started after having a ground-level fall that happened approximately 3 to 4 hours prior to arriving to the emergency room patient states he stepped awkwardly on to some stone causing him to twist his ankle and fall on his backside.  Patient states he was able to ambulate post fall but has had right ankle swelling and pain in his tailbone that is worsened over the interval..  Pain is described as Throbbing, Constant, and does not radiate  Patient rates pain as a 7 on a ten scale.    Physical exam shows some mild tenderness to palpation over midline low back and sacrum, mild tenderness palpation to the right ankle.  X-rays were obtained which did not show any acute fracture.  Discussed findings with patient and will discharge patient home with diagnosis of sacral contusion, right ankle pain.    Patient hemodynamically stable, comfortable, with stable re-examination. I have  reviewed results with Patient and or family if available.  I have answered all questions, patient voiced understanding and agreeable with treatment plan.  Patient requested to follow-up with primary care provider within 72 hours for reevaluation.  Return precautions given, with specific instructions to immediately return to ED if they develop any new, or persistent symptoms. Patient discharged from the emergency department.    DDX: includes but is not limited to: Ankle sprain, ankle pain, ankle fracture, sacral contusion, pelvic fracture, other    Problems Addressed:  Acute right ankle pain: complicated acute illness or injury  Sacral contusion, initial encounter: complicated acute illness or injury    Amount and/or Complexity of Data Reviewed  Radiology: ordered and independent interpretation performed. Decision-making details documented in ED Course.     Details: I have personally reviewed and documented all results  Discussion of management or test interpretation with external provider(s): Discussed assessment, treatment and plan with ER attending    Risk  Risk Details: I have discussed with patient the finding of the test preformed today. Patient has been diagnosed with sacral contusion, right ankle pain and will be discharged home. Advised on return precautions the importance of close follow-up with primary care provider.  Strict return precautions have been given and patient verbalizes understanding        Final diagnoses:   Sacral contusion, initial encounter   Acute right ankle pain       Please note that portions of this document were completed using voice recognition dictation software.       Toi Kaba, HARSHAD  04/10/25 [id number]

## (undated) DEVICE — PATIENT RETURN ELECTRODE, SINGLE-USE, CONTACT QUALITY MONITORING, ADULT, WITH 9FT CORD, FOR PATIENTS WEIGING OVER 33LBS. (15KG): Brand: MEGADYNE

## (undated) DEVICE — ANTIBACTERIAL UNDYED BRAIDED (POLYGLACTIN 910), SYNTHETIC ABSORBABLE SUTURE: Brand: COATED VICRYL

## (undated) DEVICE — SUT VIC 3/0 SH 27IN J416H

## (undated) DEVICE — SLV SCD CALF HEMOFORCE DVT THERP REPROC MD

## (undated) DEVICE — SUT PROLN 0 MO6 30IN 8418H

## (undated) DEVICE — GLV SURG SENSICARE W/ALOE PF LF 7 STRL

## (undated) DEVICE — SPNG GZ STRL 2S 4X4 12PLY

## (undated) DEVICE — PTFE CTD NEEDLE EXT INS 2.75': Brand: MEDLINE

## (undated) DEVICE — UNDYED BRAIDED (POLYGLACTIN 910), SYNTHETIC ABSORBABLE SUTURE: Brand: COATED VICRYL

## (undated) DEVICE — GLV SURG ULTRATOUCH BIOGEL/COAT PF LF SZ6 STRL

## (undated) DEVICE — PREP SOL DYNA-HEX CHG LIQ 4% BT 4OZ

## (undated) DEVICE — NDL HYPO ECLPS SFTY 22G 1 1/2IN

## (undated) DEVICE — STRIP,CLOSURE,WOUND,MEDI-STRIP,1/2X4: Brand: MEDLINE

## (undated) DEVICE — GLV SURG SENSICARE POLYISPRN W/ALOE PF LF 6 GRN STRL

## (undated) DEVICE — GLV SURG SENSICARE PI LF PF 7.5 GRN STRL

## (undated) DEVICE — GAUZE,SPONGE,4"X4",16PLY,XRAY,STRL,LF: Brand: MEDLINE

## (undated) DEVICE — TRY SKINPREP PVP SCRB W PAINT

## (undated) DEVICE — PENCL ES MEGADINE EZ/CLEAN BUTN W/HOLSTR 10FT

## (undated) DEVICE — SUT VIC PLS CTD ANTIB 2/0 18IN VCP111G

## (undated) DEVICE — SUT GUT CHRM 3/0 PS2 27IN 1638H

## (undated) DEVICE — DRN PENRS 1/4X18IN LTX

## (undated) DEVICE — GOWN,PREVENTION PLUS,LARGE,STERILE: Brand: MEDLINE

## (undated) DEVICE — SKIN AFFIX SURG ADHESIVE 72/CS 0.55ML: Brand: MEDLINE

## (undated) DEVICE — VIOLET BRAIDED (POLYGLACTIN 910), SYNTHETIC ABSORBABLE SUTURE: Brand: COATED VICRYL

## (undated) DEVICE — DRN PENRS SIL 1/4X18IN LF STRL

## (undated) DEVICE — RICH MAJOR PROCEDURE: Brand: MEDLINE INDUSTRIES, INC.

## (undated) DEVICE — GLV SURG SENSICARE GREEN W/ALOE PF LF 6 STRL

## (undated) DEVICE — GLV SURG SENSICARE W/ALOE PF LF 6.5 STRL

## (undated) DEVICE — ADHS LIQ MASTISOL 2/3ML

## (undated) DEVICE — PREP SOL POVIDONE/IODINE BT 4OZ

## (undated) DEVICE — SUT VIC 3/0 TIES 18IN J110T

## (undated) DEVICE — SUT GUT CHRM 4/0 RB1 27IN U203H

## (undated) DEVICE — SPNG GZ WOVN 4X4IN 12PLY 10/BX STRL

## (undated) DEVICE — DRSNG WND GZ PAD BORDERED LF 4X5IN STRL

## (undated) DEVICE — DRSNG WND GZ PAD BORDERED 4X8IN STRL

## (undated) DEVICE — SUT VIC 2/0 SH 27IN

## (undated) DEVICE — SYR LL TP 10ML STRL

## (undated) DEVICE — BLD CLIP UNIV SURG GRY

## (undated) DEVICE — KITTNER SPONGE: Brand: DEROYAL